# Patient Record
Sex: MALE | Race: ASIAN | NOT HISPANIC OR LATINO | URBAN - METROPOLITAN AREA
[De-identification: names, ages, dates, MRNs, and addresses within clinical notes are randomized per-mention and may not be internally consistent; named-entity substitution may affect disease eponyms.]

---

## 2017-05-23 ENCOUNTER — OUTPATIENT (OUTPATIENT)
Dept: OUTPATIENT SERVICES | Facility: HOSPITAL | Age: 82
LOS: 1 days | Discharge: HOME | End: 2017-05-23

## 2017-06-28 DIAGNOSIS — N39.0 URINARY TRACT INFECTION, SITE NOT SPECIFIED: ICD-10-CM

## 2017-06-28 DIAGNOSIS — N40.0 BENIGN PROSTATIC HYPERPLASIA WITHOUT LOWER URINARY TRACT SYMPTOMS: ICD-10-CM

## 2017-06-28 DIAGNOSIS — I10 ESSENTIAL (PRIMARY) HYPERTENSION: ICD-10-CM

## 2017-06-28 DIAGNOSIS — E03.8 OTHER SPECIFIED HYPOTHYROIDISM: ICD-10-CM

## 2017-06-28 DIAGNOSIS — M10.00 IDIOPATHIC GOUT, UNSPECIFIED SITE: ICD-10-CM

## 2017-06-28 DIAGNOSIS — D51.8 OTHER VITAMIN B12 DEFICIENCY ANEMIAS: ICD-10-CM

## 2017-06-28 DIAGNOSIS — E55.9 VITAMIN D DEFICIENCY, UNSPECIFIED: ICD-10-CM

## 2017-06-28 DIAGNOSIS — E78.00 PURE HYPERCHOLESTEROLEMIA, UNSPECIFIED: ICD-10-CM

## 2017-06-28 DIAGNOSIS — Z85.46 PERSONAL HISTORY OF MALIGNANT NEOPLASM OF PROSTATE: ICD-10-CM

## 2018-06-16 ENCOUNTER — EMERGENCY (EMERGENCY)
Facility: HOSPITAL | Age: 83
LOS: 0 days | Discharge: HOME | End: 2018-06-16
Attending: EMERGENCY MEDICINE | Admitting: EMERGENCY MEDICINE

## 2018-06-16 VITALS
SYSTOLIC BLOOD PRESSURE: 147 MMHG | RESPIRATION RATE: 18 BRPM | DIASTOLIC BLOOD PRESSURE: 78 MMHG | OXYGEN SATURATION: 97 % | TEMPERATURE: 96 F | HEART RATE: 79 BPM

## 2018-06-16 DIAGNOSIS — J45.909 UNSPECIFIED ASTHMA, UNCOMPLICATED: ICD-10-CM

## 2018-06-16 DIAGNOSIS — Z79.899 OTHER LONG TERM (CURRENT) DRUG THERAPY: ICD-10-CM

## 2018-06-16 DIAGNOSIS — J18.9 PNEUMONIA, UNSPECIFIED ORGANISM: ICD-10-CM

## 2018-06-16 DIAGNOSIS — R05 COUGH: ICD-10-CM

## 2018-06-16 DIAGNOSIS — Z79.51 LONG TERM (CURRENT) USE OF INHALED STEROIDS: ICD-10-CM

## 2018-06-16 RX ORDER — IPRATROPIUM/ALBUTEROL SULFATE 18-103MCG
3 AEROSOL WITH ADAPTER (GRAM) INHALATION ONCE
Qty: 0 | Refills: 0 | Status: COMPLETED | OUTPATIENT
Start: 2018-06-16 | End: 2018-06-16

## 2018-06-16 RX ORDER — DEXAMETHASONE 0.5 MG/5ML
10 ELIXIR ORAL ONCE
Qty: 0 | Refills: 0 | Status: COMPLETED | OUTPATIENT
Start: 2018-06-16 | End: 2018-06-16

## 2018-06-16 RX ORDER — GUAIFENESIN/DEXTROMETHORPHAN 600MG-30MG
1 TABLET, EXTENDED RELEASE 12 HR ORAL
Qty: 14 | Refills: 0
Start: 2018-06-16 | End: 2018-06-22

## 2018-06-16 RX ORDER — ALBUTEROL 90 UG/1
3 AEROSOL, METERED ORAL
Qty: 60 | Refills: 0
Start: 2018-06-16 | End: 2018-06-29

## 2018-06-16 RX ADMIN — Medication 10 MILLIGRAM(S): at 06:49

## 2018-06-16 RX ADMIN — Medication 3 MILLILITER(S): at 06:28

## 2018-06-16 RX ADMIN — Medication 3 MILLILITER(S): at 06:49

## 2018-06-16 NOTE — ED PROVIDER NOTE - ATTENDING CONTRIBUTION TO CARE
82 yo m with pmh of asthma, presents with c/o cough x 2 days.  saw dr. tena 2 days ago and was given rx for prednisone taper, and refills on his asthma meds.  pt admits does not have a nebulizer at home.  came this morning because was unable to sleep due to constant coughing last night.  no fever, no chills.  no abd pain, no cp, no leg swelling.  mild sob with wheezing.  exam: nad, ncat, perrl, eomi, mmm, rrr, scattered wheezing, left basilar rales, abd soft, nt,nd aox3, imp: pt with asthma, abnormal lung exam, r/o pna, cxr, nebs, decadron, abx.  paged dr. tena to discuss pt.  pt advised to return to ED asap if worsening sx or develops fever.  discussed that due to age, he is at risk of developing worsening pna and threshold for admission is low.  pt however would like to try oral meds at home first.  rx for nebulizer machine

## 2018-06-16 NOTE — ED PROVIDER NOTE - OBJECTIVE STATEMENT
82 y/o M with PMH of asthma presents with 2 days of cough. Denies any fevers, chills. No CP. No SOB. No nausea, vomiting. No abd pain. No leg swelling.

## 2018-06-16 NOTE — ED ADULT TRIAGE NOTE - AS O2 DELIVERY
----- Message from Osvaldo Burch MD sent at 1/17/2017  2:20 PM CST -----  Office f/u.  rec prednisone course. Last visit he was doing well?   ----- Message -----     From: Chepe Dolan MA     Sent: 1/17/2017  10:36 AM       To: Osvaldo Burch MD        ----- Message -----     From: Alyx Lee     Sent: 1/16/2017   2:26 PM       To: Marcin SAMUEL Staff    Patient is requesting a call back concerning his prescription he states he finished antiobotics but still does not feel well contact patient at [phone] or [phone]  Thank you     
Gave patient appointment for 1/19/2017 and advised the patient to start using his nebulizer. Patient already took 4 days of prednisone and still does not feel good.  
room air

## 2018-07-03 ENCOUNTER — OUTPATIENT (OUTPATIENT)
Dept: OUTPATIENT SERVICES | Facility: HOSPITAL | Age: 83
LOS: 1 days | Discharge: HOME | End: 2018-07-03

## 2018-07-03 DIAGNOSIS — R05 COUGH: ICD-10-CM

## 2018-07-03 DIAGNOSIS — I10 ESSENTIAL (PRIMARY) HYPERTENSION: ICD-10-CM

## 2018-07-03 DIAGNOSIS — N17.9 ACUTE KIDNEY FAILURE, UNSPECIFIED: ICD-10-CM

## 2018-07-05 DIAGNOSIS — N17.9 ACUTE KIDNEY FAILURE, UNSPECIFIED: ICD-10-CM

## 2018-07-05 DIAGNOSIS — I10 ESSENTIAL (PRIMARY) HYPERTENSION: ICD-10-CM

## 2018-07-05 DIAGNOSIS — Z02.9 ENCOUNTER FOR ADMINISTRATIVE EXAMINATIONS, UNSPECIFIED: ICD-10-CM

## 2018-07-07 ENCOUNTER — OUTPATIENT (OUTPATIENT)
Dept: OUTPATIENT SERVICES | Facility: HOSPITAL | Age: 83
LOS: 1 days | Discharge: HOME | End: 2018-07-07

## 2018-07-07 DIAGNOSIS — N18.3 CHRONIC KIDNEY DISEASE, STAGE 3 (MODERATE): ICD-10-CM

## 2018-07-14 ENCOUNTER — OUTPATIENT (OUTPATIENT)
Dept: OUTPATIENT SERVICES | Facility: HOSPITAL | Age: 83
LOS: 1 days | Discharge: HOME | End: 2018-07-14

## 2018-07-14 DIAGNOSIS — R07.9 CHEST PAIN, UNSPECIFIED: ICD-10-CM

## 2019-05-23 ENCOUNTER — OUTPATIENT (OUTPATIENT)
Dept: OUTPATIENT SERVICES | Facility: HOSPITAL | Age: 84
LOS: 1 days | Discharge: HOME | End: 2019-05-23

## 2019-05-23 DIAGNOSIS — N40.1 BENIGN PROSTATIC HYPERPLASIA WITH LOWER URINARY TRACT SYMPTOMS: ICD-10-CM

## 2019-05-23 DIAGNOSIS — E03.9 HYPOTHYROIDISM, UNSPECIFIED: ICD-10-CM

## 2019-05-23 DIAGNOSIS — E78.00 PURE HYPERCHOLESTEROLEMIA, UNSPECIFIED: ICD-10-CM

## 2019-05-23 DIAGNOSIS — I10 ESSENTIAL (PRIMARY) HYPERTENSION: ICD-10-CM

## 2019-05-23 PROBLEM — J45.909 UNSPECIFIED ASTHMA, UNCOMPLICATED: Chronic | Status: ACTIVE | Noted: 2018-06-16

## 2019-08-23 ENCOUNTER — OUTPATIENT (OUTPATIENT)
Dept: OUTPATIENT SERVICES | Facility: HOSPITAL | Age: 84
LOS: 1 days | Discharge: HOME | End: 2019-08-23

## 2019-08-23 DIAGNOSIS — E78.00 PURE HYPERCHOLESTEROLEMIA, UNSPECIFIED: ICD-10-CM

## 2022-08-31 ENCOUNTER — EMERGENCY (EMERGENCY)
Facility: HOSPITAL | Age: 87
LOS: 0 days | Discharge: NURSING, REHAB OR LONG-TERM CARE | End: 2022-08-31
Admitting: EMERGENCY MEDICINE

## 2022-08-31 ENCOUNTER — INPATIENT (INPATIENT)
Facility: HOSPITAL | Age: 87
LOS: 0 days | Discharge: HOME | End: 2022-09-01
Attending: INTERNAL MEDICINE | Admitting: INTERNAL MEDICINE

## 2022-08-31 VITALS
OXYGEN SATURATION: 95 % | SYSTOLIC BLOOD PRESSURE: 140 MMHG | RESPIRATION RATE: 17 BRPM | HEART RATE: 95 BPM | TEMPERATURE: 97 F | DIASTOLIC BLOOD PRESSURE: 66 MMHG

## 2022-08-31 DIAGNOSIS — I12.9 HYPERTENSIVE CHRONIC KIDNEY DISEASE WITH STAGE 1 THROUGH STAGE 4 CHRONIC KIDNEY DISEASE, OR UNSPECIFIED CHRONIC KIDNEY DISEASE: ICD-10-CM

## 2022-08-31 DIAGNOSIS — R53.83 OTHER FATIGUE: ICD-10-CM

## 2022-08-31 DIAGNOSIS — J45.909 UNSPECIFIED ASTHMA, UNCOMPLICATED: ICD-10-CM

## 2022-08-31 DIAGNOSIS — J12.82 PNEUMONIA DUE TO CORONAVIRUS DISEASE 2019: ICD-10-CM

## 2022-08-31 DIAGNOSIS — N18.9 CHRONIC KIDNEY DISEASE, UNSPECIFIED: ICD-10-CM

## 2022-08-31 DIAGNOSIS — U07.1 COVID-19: ICD-10-CM

## 2022-08-31 DIAGNOSIS — R06.02 SHORTNESS OF BREATH: ICD-10-CM

## 2022-08-31 DIAGNOSIS — M79.89 OTHER SPECIFIED SOFT TISSUE DISORDERS: ICD-10-CM

## 2022-08-31 LAB
ALBUMIN SERPL ELPH-MCNC: 3.8 G/DL — SIGNIFICANT CHANGE UP (ref 3.5–5.2)
ALP SERPL-CCNC: 72 U/L — SIGNIFICANT CHANGE UP (ref 30–115)
ALT FLD-CCNC: 13 U/L — SIGNIFICANT CHANGE UP (ref 0–41)
ANION GAP SERPL CALC-SCNC: 15 MMOL/L — HIGH (ref 7–14)
AST SERPL-CCNC: 30 U/L — SIGNIFICANT CHANGE UP (ref 0–41)
BASOPHILS # BLD AUTO: 0.03 K/UL — SIGNIFICANT CHANGE UP (ref 0–0.2)
BASOPHILS NFR BLD AUTO: 0.5 % — SIGNIFICANT CHANGE UP (ref 0–1)
BILIRUB SERPL-MCNC: 0.5 MG/DL — SIGNIFICANT CHANGE UP (ref 0.2–1.2)
BUN SERPL-MCNC: 25 MG/DL — HIGH (ref 10–20)
CALCIUM SERPL-MCNC: 9.1 MG/DL — SIGNIFICANT CHANGE UP (ref 8.5–10.1)
CHLORIDE SERPL-SCNC: 110 MMOL/L — SIGNIFICANT CHANGE UP (ref 98–110)
CO2 SERPL-SCNC: 22 MMOL/L — SIGNIFICANT CHANGE UP (ref 17–32)
CREAT SERPL-MCNC: 1 MG/DL — SIGNIFICANT CHANGE UP (ref 0.7–1.5)
CRP SERPL-MCNC: 30.5 MG/L — HIGH
D DIMER BLD IA.RAPID-MCNC: 269 NG/ML DDU — HIGH (ref 0–230)
EGFR: 73 ML/MIN/1.73M2 — SIGNIFICANT CHANGE UP
EOSINOPHIL # BLD AUTO: 0.16 K/UL — SIGNIFICANT CHANGE UP (ref 0–0.7)
EOSINOPHIL NFR BLD AUTO: 2.7 % — SIGNIFICANT CHANGE UP (ref 0–8)
GLUCOSE SERPL-MCNC: 95 MG/DL — SIGNIFICANT CHANGE UP (ref 70–99)
HCT VFR BLD CALC: 38.5 % — LOW (ref 42–52)
HGB BLD-MCNC: 13.6 G/DL — LOW (ref 14–18)
IMM GRANULOCYTES NFR BLD AUTO: 1.2 % — HIGH (ref 0.1–0.3)
LYMPHOCYTES # BLD AUTO: 1.35 K/UL — SIGNIFICANT CHANGE UP (ref 1.2–3.4)
LYMPHOCYTES # BLD AUTO: 22.4 % — SIGNIFICANT CHANGE UP (ref 20.5–51.1)
MCHC RBC-ENTMCNC: 31.5 PG — HIGH (ref 27–31)
MCHC RBC-ENTMCNC: 35.3 G/DL — SIGNIFICANT CHANGE UP (ref 32–37)
MCV RBC AUTO: 89.1 FL — SIGNIFICANT CHANGE UP (ref 80–94)
MONOCYTES # BLD AUTO: 0.51 K/UL — SIGNIFICANT CHANGE UP (ref 0.1–0.6)
MONOCYTES NFR BLD AUTO: 8.5 % — SIGNIFICANT CHANGE UP (ref 1.7–9.3)
NEUTROPHILS # BLD AUTO: 3.9 K/UL — SIGNIFICANT CHANGE UP (ref 1.4–6.5)
NEUTROPHILS NFR BLD AUTO: 64.7 % — SIGNIFICANT CHANGE UP (ref 42.2–75.2)
NRBC # BLD: 0 /100 WBCS — SIGNIFICANT CHANGE UP (ref 0–0)
NT-PROBNP SERPL-SCNC: 1036 PG/ML — HIGH (ref 0–300)
PLATELET # BLD AUTO: 190 K/UL — SIGNIFICANT CHANGE UP (ref 130–400)
POTASSIUM SERPL-MCNC: 4.2 MMOL/L — SIGNIFICANT CHANGE UP (ref 3.5–5)
POTASSIUM SERPL-SCNC: 4.2 MMOL/L — SIGNIFICANT CHANGE UP (ref 3.5–5)
PROT SERPL-MCNC: 6.5 G/DL — SIGNIFICANT CHANGE UP (ref 6–8)
RBC # BLD: 4.32 M/UL — LOW (ref 4.7–6.1)
RBC # FLD: 13.2 % — SIGNIFICANT CHANGE UP (ref 11.5–14.5)
SARS-COV-2 RNA SPEC QL NAA+PROBE: DETECTED
SODIUM SERPL-SCNC: 147 MMOL/L — HIGH (ref 135–146)
TROPONIN T SERPL-MCNC: <0.01 NG/ML — SIGNIFICANT CHANGE UP
WBC # BLD: 6.02 K/UL — SIGNIFICANT CHANGE UP (ref 4.8–10.8)
WBC # FLD AUTO: 6.02 K/UL — SIGNIFICANT CHANGE UP (ref 4.8–10.8)

## 2022-08-31 PROCEDURE — 93010 ELECTROCARDIOGRAM REPORT: CPT

## 2022-08-31 PROCEDURE — 99285 EMERGENCY DEPT VISIT HI MDM: CPT | Mod: CS

## 2022-08-31 PROCEDURE — 71045 X-RAY EXAM CHEST 1 VIEW: CPT | Mod: 26

## 2022-08-31 PROCEDURE — 93970 EXTREMITY STUDY: CPT | Mod: 26

## 2022-08-31 RX ORDER — AMLODIPINE BESYLATE 2.5 MG/1
1 TABLET ORAL
Qty: 0 | Refills: 0 | DISCHARGE

## 2022-08-31 RX ORDER — METOPROLOL TARTRATE 50 MG
100 TABLET ORAL DAILY
Refills: 0 | Status: DISCONTINUED | OUTPATIENT
Start: 2022-08-31 | End: 2022-09-01

## 2022-08-31 RX ORDER — ACETAMINOPHEN 500 MG
650 TABLET ORAL EVERY 6 HOURS
Refills: 0 | Status: DISCONTINUED | OUTPATIENT
Start: 2022-08-31 | End: 2022-09-01

## 2022-08-31 RX ORDER — FLUTICASONE FUROATE AND VILANTEROL TRIFENATATE 100; 25 UG/1; UG/1
0 POWDER RESPIRATORY (INHALATION)
Qty: 0 | Refills: 0 | DISCHARGE

## 2022-08-31 RX ORDER — MONTELUKAST 4 MG/1
0 TABLET, CHEWABLE ORAL
Qty: 0 | Refills: 0 | DISCHARGE

## 2022-08-31 RX ORDER — LANOLIN ALCOHOL/MO/W.PET/CERES
3 CREAM (GRAM) TOPICAL AT BEDTIME
Refills: 0 | Status: DISCONTINUED | OUTPATIENT
Start: 2022-08-31 | End: 2022-09-01

## 2022-08-31 RX ORDER — VALSARTAN 80 MG/1
320 TABLET ORAL DAILY
Refills: 0 | Status: DISCONTINUED | OUTPATIENT
Start: 2022-08-31 | End: 2022-09-01

## 2022-08-31 RX ORDER — FUROSEMIDE 40 MG
20 TABLET ORAL ONCE
Refills: 0 | Status: COMPLETED | OUTPATIENT
Start: 2022-08-31 | End: 2022-08-31

## 2022-08-31 RX ORDER — LEVOTHYROXINE SODIUM 125 MCG
1 TABLET ORAL
Qty: 0 | Refills: 0 | DISCHARGE

## 2022-08-31 RX ORDER — HYDROCHLOROTHIAZIDE 25 MG
25 TABLET ORAL DAILY
Refills: 0 | Status: DISCONTINUED | OUTPATIENT
Start: 2022-08-31 | End: 2022-09-01

## 2022-08-31 RX ORDER — CEFTRIAXONE 500 MG/1
1000 INJECTION, POWDER, FOR SOLUTION INTRAMUSCULAR; INTRAVENOUS ONCE
Refills: 0 | Status: COMPLETED | OUTPATIENT
Start: 2022-08-31 | End: 2022-08-31

## 2022-08-31 RX ORDER — ENOXAPARIN SODIUM 100 MG/ML
40 INJECTION SUBCUTANEOUS EVERY 24 HOURS
Refills: 0 | Status: DISCONTINUED | OUTPATIENT
Start: 2022-08-31 | End: 2022-09-01

## 2022-08-31 RX ORDER — AZITHROMYCIN 500 MG/1
500 TABLET, FILM COATED ORAL ONCE
Refills: 0 | Status: COMPLETED | OUTPATIENT
Start: 2022-08-31 | End: 2022-08-31

## 2022-08-31 RX ORDER — AMLODIPINE BESYLATE 2.5 MG/1
5 TABLET ORAL DAILY
Refills: 0 | Status: DISCONTINUED | OUTPATIENT
Start: 2022-08-31 | End: 2022-09-01

## 2022-08-31 RX ORDER — NEBIVOLOL HYDROCHLORIDE 5 MG/1
1 TABLET ORAL
Qty: 0 | Refills: 0 | DISCHARGE

## 2022-08-31 RX ORDER — ALBUTEROL 90 UG/1
2 AEROSOL, METERED ORAL EVERY 4 HOURS
Refills: 0 | Status: DISCONTINUED | OUTPATIENT
Start: 2022-08-31 | End: 2022-09-01

## 2022-08-31 RX ORDER — ONDANSETRON 8 MG/1
4 TABLET, FILM COATED ORAL EVERY 8 HOURS
Refills: 0 | Status: DISCONTINUED | OUTPATIENT
Start: 2022-08-31 | End: 2022-09-01

## 2022-08-31 RX ORDER — LEVOTHYROXINE SODIUM 125 MCG
50 TABLET ORAL DAILY
Refills: 0 | Status: DISCONTINUED | OUTPATIENT
Start: 2022-08-31 | End: 2022-09-01

## 2022-08-31 RX ORDER — GUAIFENESIN/DEXTROMETHORPHAN 600MG-30MG
10 TABLET, EXTENDED RELEASE 12 HR ORAL EVERY 4 HOURS
Refills: 0 | Status: DISCONTINUED | OUTPATIENT
Start: 2022-08-31 | End: 2022-09-01

## 2022-08-31 RX ADMIN — CEFTRIAXONE 100 MILLIGRAM(S): 500 INJECTION, POWDER, FOR SOLUTION INTRAMUSCULAR; INTRAVENOUS at 14:33

## 2022-08-31 RX ADMIN — Medication 10 MILLILITER(S): at 21:45

## 2022-08-31 RX ADMIN — AZITHROMYCIN 255 MILLIGRAM(S): 500 TABLET, FILM COATED ORAL at 15:28

## 2022-08-31 RX ADMIN — Medication 20 MILLIGRAM(S): at 14:33

## 2022-08-31 NOTE — ED PROVIDER NOTE - CLINICAL SUMMARY MEDICAL DECISION MAKING FREE TEXT BOX
Pt with persisting sob since covid diagnosis, found to have L basilar opacity/effusion, started on abx, will admit due to high risk of decompensation.

## 2022-08-31 NOTE — PATIENT PROFILE ADULT - FALL HARM RISK - HARM RISK INTERVENTIONS

## 2022-08-31 NOTE — H&P ADULT - NSHPREVIEWOFSYSTEMS_GEN_ALL_CORE
REVIEW OF SYSTEMS:    CONSTITUTIONAL:  No weakness, fevers or chills  EYES/ENT:  No visual changes;  No vertigo or throat pain   NECK:  No pain or stiffness  RESPIRATORY:  No cough, wheezing, hemoptysis; No shortness of breath  CARDIOVASCULAR:  No chest pain or palpitations  GASTROINTESTINAL:  No abdominal or epigastric pain. No nausea, vomiting, or hematemesis; No diarrhea or constipation. No melena or hematochezia.  GENITOURINARY:  No dysuria, frequency or hematuria  NEUROLOGICAL:  No numbness or weakness  SKIN:  No itching, rashes REVIEW OF SYSTEMS:  CONSTITUTIONAL:  no fevers or chills  EYES/ENT:  No visual changes;  No vertigo or throat pain   NECK:  No pain or stiffness  RESPIRATORY:  + cough with yellow sputum , + hemoptysis x1 - resolved  CARDIOVASCULAR:  No chest pain or palpitations  GASTROINTESTINAL:  No abdominal or epigastric pain. No nausea, vomiting, or hematemesis; No diarrhea or constipation. No melena or hematochezia.  GENITOURINARY:  No dysuria, frequency or hematuria  NEUROLOGICAL:  No numbness or weakness  SKIN:  No itching, rashes

## 2022-08-31 NOTE — H&P ADULT - HISTORY OF PRESENT ILLNESS
86 yo m  PMH of htn, asthma, ckd  Presents with c/o persisting sob.    Pt was recently dx with covid in [place] ()  Symptoms started 5 days ago whiel on a cruise. Airlifted to hospital in [place].  Treated for asthma exacerbation, given steroids x 1 and furosemide  DCed on  paxlovid but did not take because was concerned for his kidneys.     Pt felt sob again the next day after dicharge and went back to the ED. Per family his pO2 this morning on pulse ox was 91% and had POWERS + leg swelling.  Pt denies cp.  denies fever or chills.  mild fatigue.  no n/v/d.    Wife also tested positive for COVID and is currently admitted    In the ED :  WBG : 6  DD: 269  Na : 137  CRP : 30   BNP: 1k  trop <0.01 x1   CXR : Left basilar opacity/effusion  s/p ceftriaxone , azithro x1 , lasiz 20 Iv x1 86 yo m  PMH of htn, asthma, hypothryoid   Presents with c/o persisting sob.    Pt was recently dx with covid in [place] 8/27, symptoms started 8/26  Symptoms started while on a cruise with other sick passengers. Wife was also sick Airlifted to hospital in [place].  Treated for asthma exacerbation, given steroids x 1 and furosemide  DCed on  paxlovid but did not take because was concerned for his kidneys.     Pt felt sob again the next day after discharge and went back to the ED. Per family his pO2 this morning on pulse ox was 91% and had POWERS + leg swelling.  Pt denies cp.  Denies fever or chills.  mild fatigue.  no n/v/d.    Wife also tested positive for COVID and is currently admitted    In the ED :  BP : 140/66  HR : 95  O2 : 95 on RA   T : 96.8    WBC : 6  DD: 269  Na : 137  CRP : 30   BNP: 1k  trop <0.01 x1   CXR : Left basilar opacity/effusion  s/p ceftriaxone x1 , azithro x1 , lasix 20 Iv x1

## 2022-08-31 NOTE — H&P ADULT - ASSESSMENT
88 yo m with PMH of htn, asthma, hypothryoid presents with c/o persisting sob with + COVID.      # + COVID   - not requiring O2   - Dx with covid in [place] 8/27, symptoms started 8/26  - Vaccinated x3 Moderna , Last dose jan  - in [place] at hospital got steroids x 1 and furosemide  - s/p zpac , pred PO at home   - CXR : Left basilar opacity/effusion  - in ED s/p ceftriaxone x1 , azithro x1 , lasix 20 Iv x1  - consulted ID for remdesivir if needed.    # HTN  - c/w home amlodipine 5 qd, hctz 25 qd, va;lsartan 320 qd  - home nebivolol -> metoprolol while inpatient     # Asthma   - Albuterol PRN q4     # Hypothyroid   - levothyroxine 50 qd     # DVT : Lovenox   # Diet : regular   # Dispo : from home   # Activity : as tolerated , Pt consulted

## 2022-08-31 NOTE — ED PROVIDER NOTE - PHYSICAL EXAMINATION
VITAL SIGNS: I have reviewed nursing notes and confirm.  CONSTITUTIONAL: Well-developed; well-nourished; in no acute distress.  SKIN: Skin exam is warm and dry, no acute rash.  HEAD: Normocephalic; atraumatic.  EYES: PERRL, EOM intact; conjunctiva and sclera clear.  ENT: No nasal discharge; airway clear. TMs clear.  NECK: Supple; non tender.  CARD: S1, S2 normal; no murmurs, gallops, or rubs. Regular rate and rhythm.  RESP: No wheezes, rales, bibasilar ronchi mild, R>L  ABD: Normal bowel sounds; soft; non-distended; non-tender;  EXT: Normal ROM. No clubbing, cyanosis or mild pitting edema.  PSYCH: Cooperative, appropriate.

## 2022-08-31 NOTE — ED PROVIDER NOTE - NS ED ROS FT
Review of Systems:  	•	CONSTITUTIONAL - no fever, no diaphoresis, no weight change  	•	SKIN - no rash  	•	HEMATOLOGIC - no bleeding, no bruising  	•	EYES - no eye pain, no blurred vision  	•	ENT - no change in hearing, no pain  	•	RESPIRATORY - + shortness of breath, no cough  	•	CARDIAC - no chest pain, no palpitations  	•	GI - no abd pain, no nausea, no vomiting, no diarrhea, no constipation, no bleeding  	•	 - no dysuria, no hematuria, no flank pain, no urinary retention  	•	MUSCULOSKELETAL - no joint paint, no swelling, no redness  	•	NEUROLOGIC - no focal weakness or numbness, no headache, no paresthesias, no dizziness, no facial droop, no slurred speech, no vision changes  All other systems negative, unless specified in HPI

## 2022-08-31 NOTE — H&P ADULT - ATTENDING COMMENTS
***My note supersedes any discrepancies that may be above in the resident's note***    88 yo m with PMH of htn, asthma, hypothryoid presents with c/o persisting dyspnea in the setting of positive COVID- pcr.    # Dsypnea  # + COVID   # Hx of asthma  - currently afebrile, HDS, and satting well on RA  - on exam, no audible course breath sounds or wheezing  - Dx with covid in [place] 8/27, symptoms started 8/26  - Vaccinated x3 Moderna , Last dose jan  - in [place] at hospital got steroids x 1 and furosemide  - s/p zpac , pred PO at home   - CXR : Left basilar opacity/effusion  - no leukocytosis, procal not significantly elevated at 0.2; low concern for bacterial PNA  - in ED s/p ceftriaxone x1 , azithro x1 , lasix 20 Iv x1  - ID consulted:  start on RDV (200 mg on day 1, 100 mg on day 2 or 3) x 3 days     # HTN  - c/w home amlodipine 5 qd, hctz 25 qd, valsartan 320 qd  - home nebivolol -> metoprolol while inpatient     # Asthma   - Albuterol PRN q4     # Hypothyroid   - levothyroxine 50 qd     # DVT : Lovenox   # Diet : regular   # Dispo : from home   # Activity : as tolerated , Pt consulted

## 2022-08-31 NOTE — ED PROVIDER NOTE - OBJECTIVE STATEMENT
86 yo m with pmh of htn, asthma, ckd, presents with c/o persisting sob.  pt was recently dx with covid.  symptoms started 5 days ago.  was airlifted to [place] from cruise ship and evaluated in ED.  pt was treated for asthma exacerbation, given steroids x 1 and furosemide, and given rx for paxlovid.  pt did not take paxlovid because was concerned for his kidneys.  pt felt sob again the next day and went back to the ED.  as per family, his pO2 this morning on pulse ox was 91% and had POWERS.  pt denies cp.  +admits b/l leg swelling  denies fever or chills.  mild fatigue.  no n/v/d.  wife also tested positive and is currently admitted

## 2022-08-31 NOTE — H&P ADULT - NSHPLABSRESULTS_GEN_ALL_CORE
(08-31 @ 12:13)                      13.6  6.02 )-----------( 190                 38.5    Neutrophils = 3.90 (64.7%)  Lymphocytes = 1.35 (22.4%)  Eosinophils = 0.16 (2.7%)  Basophils = 0.03 (0.5%)  Monocytes = 0.51 (8.5%)  Bands = --%    08-31    147<H>  |  110  |  25<H>  ----------------------------<  95  4.2   |  22  |  1.0    Ca    9.1      31 Aug 2022 12:13    TPro  6.5  /  Alb  3.8  /  TBili  0.5  /  DBili  x   /  AST  30  /  ALT  13  /  AlkPhos  72  08-31      CARDIAC MARKERS ( 31 Aug 2022 12:13 )  Trop <0.01 ng/mL / CK x     / CKMB x

## 2022-08-31 NOTE — ED ADULT NURSE NOTE - NSIMPLEMENTINTERV_GEN_ALL_ED
Implemented All Fall with Harm Risk Interventions:  [place] to call system. Call bell, personal items and telephone within reach. Instruct patient to call for assistance. Room bathroom lighting operational. Non-slip footwear when patient is off stretcher. Physically safe environment: no spills, clutter or unnecessary equipment. Stretcher in lowest position, wheels locked, appropriate side rails in place. Provide visual cue, wrist band, yellow gown, etc. Monitor gait and stability. Monitor for mental status changes and reorient to person, place, and time. Review medications for side effects contributing to fall risk. Reinforce activity limits and safety measures with patient and family. Provide visual clues: red socks.

## 2022-08-31 NOTE — PATIENT PROFILE ADULT - HOW PATIENT ADDRESSED, PROFILE
Formerly named [facility] Physician Discharge Summary       Constantine Menendez MD  [address] [facility] [address]  [phone]    In 2 weeks  follow up    Magali Gage MD  [place] [phone]    In 1 week  follow up    Unique Aranda MD  [address] [facility] [address] [id number]  [phone]      As needed      Activity level:as tolerated    Diet: ADULT DIET; Regular; Low Fat/Low Chol/High Fiber/LUZ; Low Sodium (2 gm); 2000 ml      Labs: CBC, BMP Monday  Condition at discharge:  stable  Dispo: home      Patient ID:  Konrad Kim  [id number]  80 y.o.  1937    Admit date: 3/1/2022    Discharge date and time:  3/4/2022  11:00 AM    Admission Diagnoses: Principal Problem:    Acute on chronic combined systolic and diastolic CHF (congestive heart failure) (HCC)  Active Problems:    Chronic atrial fibrillation    Ischemic heart disease    Stage 3b chronic kidney disease ([address]    Coronary artery disease involving coronary bypass graft    S/P TAVR (transcatheter aortic valve replacement)    Essential hypertension    Hyperlipidemia LDL goal <100    ICD (implantable cardioverter-defibrillator), dual, in situ    Gastroesophageal reflux disease without esophagitis  Resolved Problems:    * No resolved hospital problems. *      Discharge Diagnoses: Principal Problem:    Acute on chronic combined systolic and diastolic CHF (congestive heart failure) (HCC)  Active Problems:    Chronic atrial fibrillation    Ischemic heart disease    Stage 3b chronic kidney disease (HCC)    Coronary artery disease involving coronary bypass graft    S/P TAVR (transcatheter aortic valve replacement)    Essential hypertension    Hyperlipidemia LDL goal <100    ICD (implantable cardioverter-defibrillator), dual, in situ    Gastroesophageal reflux disease without esophagitis  Resolved Problems:    * No resolved hospital problems.  *      Consults:  IP CONSULT TO HEART FAILURE NURSE/COORDINATOR  IP CONSULT TO DIETITIAN  IP CONSULT TO CARDIOLOGY    Procedures:  none    Hospital Course:     80 y.o. male with a past medical  history of CHF, atrial fibrillation, nonsustained ventricular tachycardia, ischemic heart disease,  hyperlipidemia, hypertension, history of MI, insomnia, remote history of gastrointestinal hemorrhage with melena, ICD placement, vitamin D deficiency, and macular degeneration who was admitted for acute on chronic CHF after presenting to the ER for worsening shortness of breath. Consult was made to [facility] cardiology and updated echocardiogram obtained 03/02 showing EF 73% stage II diastolic dysfunction. He was started on IV lasix and eventually transitioned to oral. Dose was reduced to 40 mg. Home dose was 60 mg daily. Lipid panel showed elevated triglycerides at 450. Lipid medications were not changed due to history or prior intolerance to other agents. Patient was noted to be anemic with hgb 8. 6. Down from 13.0 in October 2021. No overt signs of bleeding. Patient does have a remote history of GIB. We will have repeat labs ordered for Monday and he will need to follow up with his PCP. Normal iron studies, patient was asymptomatic. His shortness of breath resolved and he is stable for discharge to home with follow up as above.        Discharge Exam:  Vitals:    03/04/22 0211 03/04/22 0330 03/04/22 0715 03/04/22 0745   BP:  115/72 (!) 104/48 136/63   Pulse:  90 73 79   Resp:  18 16 18   Temp:  98.3 °F (36.8 °C) 98 °F (36.7 °C) 97.7 °F (36.5 °C)   TempSrc:  Oral Oral Oral   SpO2:  93% 93% 96%   Weight: 167 lb 8 oz (76 kg)      Height:           General Appearance: alert and oriented to person, place and time and in no acute distress  Skin: warm and dry  Head: normocephalic and atraumatic  Eyes: pupils equal, round, and reactive to light, extraocular eye movements intact, conjunctivae normal  Neck: neck supple and non tender without mass   Pulmonary/Chest: fine crackles subhash bases. no wheezes, rales or rhonchi, normal air movement, no respiratory distress  Cardiovascular: normal rate, irregular rythm normal S1 and S2 and no carotid bruits  Abdomen: soft, non-tender, non-distended, normal bowel sounds, no masses or organomegaly  Extremities: no cyanosis, no clubbing and no edema  Neurologic: no cranial nerve deficit and speech normal  I/O last 3 completed shifts: In: 200 [P.O.:660; I.V.:5]  Out: 2925 [Urine:2925]  No intake/output data recorded. LABS:  Recent Labs     03/02/22  1010 03/03/22  0310 03/04/22  0340    141 138   K 4.0 4.2 3.9    102 100   CO2 28 28 28   BUN 38* 41* 39*   CREATININE 1.3* 1.1 1.1   GLUCOSE 136* 120* 123*   CALCIUM 9.0 8.7 8.6       Recent Labs     03/02/22  1010 03/03/22  0310 03/04/22  0340   WBC 8.2 8.9 8.6   RBC 2.98* 2.75* 2.78*   HGB 9.2* 8.6* 8.6*   HCT 28.2* 26.5* 26.8*   MCV 94.6 96.4 96.4   MCH 30.9 31.3 30.9   MCHC 32.6 32.5 32.1   RDW 15.2* 15.4* 15.6*    189 192   MPV 10.7 11.0 10.3       No results for input(s): POCGLU in the last 72 hours. Imaging:  Echo Complete    Result Date: 3/2/2022  Transthoracic Echocardiography Report (TTE)  Demographics   Patient Name        Ramona Milligan Gender                Male   Medical Record      [id number]        Room Number           0533  Number   Account #           [de-identified]       Procedure Date        03/02/2022   Corporate ID                        Ordering Physician   Accession Number    [id number]      Referring Physician   Marlena Russ   Date of Birth       1937      Sonographer           Mabel Abdul [facility]   Age                 80 year(s)      Interpreting          Dearl Severino PETERSON                                      Physician                                       Any Other  Procedure Type of Study   TTE procedure:Echo Complete W/Doppler & Color Flow.   Procedure Date Date: 03/02/2022 Start: 07:31 AM Study Location: Echo Lab Technical Quality: Limited visualization due to poor acoustical window. Indications:Congestive heart failure. Patient Status: Routine Height: 68 inches Weight: 170 pounds BSA: 1.91 m^2 BMI: 25.85 kg/m^2 HR: 78 bpm BP: 113/58 mmHg  Findings   Left Ventricle  Left ventricle size is normal.  Concentric left ventricular hypertrophy. Normal left ventricular systolic function. Ejection fraction is visually estimated at 60%. There is doppler evidence of stage II diastolic dysfunction. Right Ventricle  Mildly dilated right ventricle with normal right ventricular function. Pacer/ICD wire visualized in the right ventricle. Left Atrium  Moderately dilated left atrium by volume index. Right Atrium  Dilated right atrium. Mitral Valve  Mild mitral regurgitation. MV mean gradient 4 mmHg. Tricuspid Valve  Mild tricuspid regurgitation. PASP is estimated at 44 mmHg. Aortic Valve  History of TAVR (ZANE 3) with 26 mm bioprosthetic valve. No significant paravalvular aortic regurgitation. AV peak velocity 2.1 m/s. AV mean gradient 9 mmHg. AV area 1.5 cm2. Pulmonic Valve  Mild pulmonic regurgitation. Pericardial Effusion  No evidence of a hemodynamically significant pericardial effusion. Aorta  Aortic root dimension within normal limits. Conclusions   Summary  Normal left ventricular systolic function. Ejection fraction is visually estimated at 60%. Mildly dilated right ventricle with normal right ventricular function. There is doppler evidence of stage II diastolic dysfunction. Moderately dilated left atrium by volume index. Mild mitral regurgitation. History of TAVR (ZANE 3) with 26 mm bioprosthetic valve. No significant paravalvular aortic regurgitation. AV peak velocity 2.1 m/s. AV mean gradient 9 mmHg. AV area 1.5 cm2. Mild tricuspid regurgitation. PASP is estimated at 44 mmHg.    Signature   ----------------------------------------------------------------  Electronically signed by Cristi Mello MD(Interpreting physician) on 03/02/2022 12:04 PM  ----------------------------------------------------------------  M-Mode/2D Measurements & Calculations   LV Diastolic    LV Systolic Dimension: 3.3   AV Cusp Separation: 1.4 cmLA  Dimension: 4.7  cm                           Dimension: 4.3 cmAO Root  cm              LV Volume Diastolic: 505 ml  Dimension: 1.9 cm  LV FS:29.8 %    LV Volume Systolic: 39.3 ml  LV PW           LV EDV/LV EDV Index: 290  Diastolic: 1.6  MY/30 DD/S^1HQ ESV/LV ESV  cm              Index: 43.8 ml/23ml/ m^2     RV Diastolic Dimension: 3.7  LV PW Systolic: EF Calculated: 17.0 %        cm  1.7 cm          LV Mass Index: 195 l/min*m^2  Septum                                       LA/Aorta: 0.59  Diastolic: 1.9                               Ascending Aorta: 2.2 cm  cm              LVOT: 2 cm                   LA volume/Index: 87 ml  Septum                                       /16BE/U^5  Systolic: 2 cm                               RA Area: 20.2 cm^2  CO: 4.75 l/min  CI: 2.49                                     IVC Expiration: 1.8 cm  l/m*m^2  LV Mass: 372.96  g  Doppler Measurements & Calculations   MV Peak E-Wave: 1.42 AV Peak Velocity: 2.12 LVOT Peak Velocity: 0.96 m/s  m/s                  m/s                    LVOT Mean Velocity: 0.64 m/s  MV Peak A-Wave: 0.96 AV Peak Gradient:      LVOT Peak Gradient: 3.7  m/s                  17.89 mmHg             mmHgLVOT Mean Gradient: 1.9  MV E/A Ratio: 1.48   AV Mean Velocity: 1.47 mmHg  MV Peak Gradient:    m/s                    Estimated RVSP: 44.4 mmHg  10.9 mmHg            AV Mean Gradient: 9.3  Estimated RAP:3 mmHg  MV Mean Gradient:    mmHg  4.3 mmHg             AV VTI: 41.4 cm  MV Mean Velocity:    AV Area                TR Velocity:3.22 m/s  0.95 m/s             (Continuity):1.47 cm^2 TR Gradient:41.34 mmHg  MV Deceleration      AV Acceleration Time:  PV Peak Velocity: 1.12 m/s  Time: 184.9 msec     69.2 msec              PV Peak Gradient: 4.98 mmHg  MV P1/2t: 67.6 msec  LVOT VTI: 19.4 cm      PV Mean Velocity: 0.7 m/s  MVA by PHT:3.25 cm^2                        PV Mean Gradient: 2.3 mmHg  MV Area              Estimated PASP: 44.34  (continuity): 1.5    mmHg  cm^2  MV E' Septal  Velocity: 0.04 m/s  MV E' Lateral  Velocity: 7 m/s  [email or web address] [id number]    XR CHEST PORTABLE    Result Date: 3/1/2022  EXAMINATION: ONE XRAY VIEW OF THE CHEST 3/1/2022 2:03 pm COMPARISON: None. HISTORY: ORDERING SYSTEM PROVIDED HISTORY: A-fib TECHNOLOGIST PROVIDED HISTORY: Reason for exam:->A-fib FINDINGS: Cardiac silhouette is enlarged. Postop sternotomy changes are noted. There is a dual lead cardiac pacer on the left The lungs are clear. No findings of CHF or pneumonia. 1. Cardiomegaly. There are no findings of CHF or pneumonia. Patient Instructions:      Medication List      CHANGE how you take these medications    furosemide 40 MG tablet  Commonly known as: LASIX  Take 1 tablet by mouth daily New lower dose  What changed:   · how much to take  · how to take this  · when to take this  · additional instructions     rivaroxaban 15 MG Tabs tablet  Commonly known as: XARELTO  Take 1 tablet by mouth daily New lower dose  What changed:   · medication strength  · See the new instructions. CONTINUE taking these medications    amLODIPine 2.5 MG tablet  Commonly known as: NORVASC  Take 1 tablet by mouth daily     dofetilide 250 MCG capsule  Commonly known as: TIKOSYN  Take 1 capsule by mouth 2 times daily     ferrous sulfate 325 (65 Fe) MG tablet  Commonly known as: IRON 325  Take 1 tablet by mouth daily (with breakfast)     Handicap Placard Misc  by Does not apply route Disp #: 1  Duration: 5 years.      MAGnesium-Oxide 400 (241.3 Mg) MG Tabs tablet  Generic drug: magnesium oxide     metoprolol tartrate 50 MG tablet  Commonly known as: LOPRESSOR  TAKE 1 AND 1/2 TABLETS BY  MOUTH IN THE MORNING AND 1  TABLET BY MOUTH AT BEDTIME     pantoprazole 40 MG tablet  Commonly known as: PROTONIX  TAKE 1 TABLET BY MOUTH  DAILY     potassium chloride 10 MEQ extended release tablet  Commonly known as: KLOR-CON M  TAKE 1 TABLET BY MOUTH  DAILY     pravastatin 40 MG tablet  Commonly known as: PRAVACHOL  TAKE 1 TABLET BY MOUTH  DAILY     PRESERVISION AREDS 2 PO     vitamin D 50 MCG (2000 UT) Caps capsule        STOP taking these medications    potassium chloride 10 MEQ extended release capsule  Commonly known as: MICRO-K           Where to Get Your Medications      These medications were sent to [address] [address]    Phone: [phone]   · furosemide 40 MG tablet  · rivaroxaban 15 MG Tabs tablet          Note that more than 30 minutes was spent in preparing discharge papers, discussing discharge with patient, medication review, etc.       Signed:  Electronically signed by GINETTE Bello Mai, CNP on 3/4/2022 at 11:00 AM Mr Khan

## 2022-08-31 NOTE — ED ADULT TRIAGE NOTE - CHIEF COMPLAINT QUOTE
pt states " my pulse ox is low and my asthma is acting up " covid +, recent treated 8/27-8/30 for "covid pna"

## 2022-08-31 NOTE — ED PROVIDER NOTE - PROGRESS NOTE DETAILS
pt is high risk for decompensation.  no hypoxia in ED, but dyspnic.    feels better with supplemental o2

## 2022-09-01 VITALS
DIASTOLIC BLOOD PRESSURE: 66 MMHG | HEART RATE: 61 BPM | TEMPERATURE: 98 F | OXYGEN SATURATION: 96 % | RESPIRATION RATE: 17 BRPM | SYSTOLIC BLOOD PRESSURE: 138 MMHG

## 2022-09-01 LAB
ANION GAP SERPL CALC-SCNC: 12 MMOL/L — SIGNIFICANT CHANGE UP (ref 7–14)
BUN SERPL-MCNC: 23 MG/DL — HIGH (ref 10–20)
CALCIUM SERPL-MCNC: 9.4 MG/DL — SIGNIFICANT CHANGE UP (ref 8.5–10.1)
CHLORIDE SERPL-SCNC: 103 MMOL/L — SIGNIFICANT CHANGE UP (ref 98–110)
CO2 SERPL-SCNC: 27 MMOL/L — SIGNIFICANT CHANGE UP (ref 17–32)
CREAT SERPL-MCNC: 1.3 MG/DL — SIGNIFICANT CHANGE UP (ref 0.7–1.5)
EGFR: 53 ML/MIN/1.73M2 — LOW
FERRITIN SERPL-MCNC: 322 NG/ML — SIGNIFICANT CHANGE UP (ref 30–400)
GLUCOSE SERPL-MCNC: 167 MG/DL — HIGH (ref 70–99)
HCT VFR BLD CALC: 42.1 % — SIGNIFICANT CHANGE UP (ref 42–52)
HGB BLD-MCNC: 14.4 G/DL — SIGNIFICANT CHANGE UP (ref 14–18)
MCHC RBC-ENTMCNC: 29.9 PG — SIGNIFICANT CHANGE UP (ref 27–31)
MCHC RBC-ENTMCNC: 34.2 G/DL — SIGNIFICANT CHANGE UP (ref 32–37)
MCV RBC AUTO: 87.5 FL — SIGNIFICANT CHANGE UP (ref 80–94)
NRBC # BLD: 0 /100 WBCS — SIGNIFICANT CHANGE UP (ref 0–0)
PLATELET # BLD AUTO: 228 K/UL — SIGNIFICANT CHANGE UP (ref 130–400)
POTASSIUM SERPL-MCNC: 3.4 MMOL/L — LOW (ref 3.5–5)
POTASSIUM SERPL-SCNC: 3.4 MMOL/L — LOW (ref 3.5–5)
PROCALCITONIN SERPL-MCNC: 0.23 NG/ML — HIGH (ref 0.02–0.1)
RBC # BLD: 4.81 M/UL — SIGNIFICANT CHANGE UP (ref 4.7–6.1)
RBC # FLD: 13.2 % — SIGNIFICANT CHANGE UP (ref 11.5–14.5)
SODIUM SERPL-SCNC: 142 MMOL/L — SIGNIFICANT CHANGE UP (ref 135–146)
WBC # BLD: 5.68 K/UL — SIGNIFICANT CHANGE UP (ref 4.8–10.8)
WBC # FLD AUTO: 5.68 K/UL — SIGNIFICANT CHANGE UP (ref 4.8–10.8)

## 2022-09-01 PROCEDURE — 99221 1ST HOSP IP/OBS SF/LOW 40: CPT

## 2022-09-01 RX ORDER — IPRATROPIUM/ALBUTEROL SULFATE 18-103MCG
3 AEROSOL WITH ADAPTER (GRAM) INHALATION EVERY 4 HOURS
Refills: 0 | Status: DISCONTINUED | OUTPATIENT
Start: 2022-09-01 | End: 2022-09-01

## 2022-09-01 RX ORDER — ACETAMINOPHEN 500 MG
2 TABLET ORAL
Qty: 0 | Refills: 0 | DISCHARGE
Start: 2022-09-01

## 2022-09-01 RX ORDER — ALBUTEROL 90 UG/1
0 AEROSOL, METERED ORAL
Qty: 0 | Refills: 0 | DISCHARGE

## 2022-09-01 RX ADMIN — Medication 10 MILLILITER(S): at 11:04

## 2022-09-01 RX ADMIN — Medication 10 MILLILITER(S): at 13:40

## 2022-09-01 RX ADMIN — Medication 10 MILLILITER(S): at 05:14

## 2022-09-01 RX ADMIN — AMLODIPINE BESYLATE 5 MILLIGRAM(S): 2.5 TABLET ORAL at 05:15

## 2022-09-01 RX ADMIN — Medication 100 MILLIGRAM(S): at 05:15

## 2022-09-01 RX ADMIN — ENOXAPARIN SODIUM 40 MILLIGRAM(S): 100 INJECTION SUBCUTANEOUS at 05:15

## 2022-09-01 RX ADMIN — Medication 50 MICROGRAM(S): at 05:15

## 2022-09-01 RX ADMIN — Medication 25 MILLIGRAM(S): at 05:15

## 2022-09-01 RX ADMIN — VALSARTAN 320 MILLIGRAM(S): 80 TABLET ORAL at 05:14

## 2022-09-01 NOTE — CONSULT NOTE ADULT - SUBJECTIVE AND OBJECTIVE BOX
HECTOR VELAZQUEZ  87y, Male  Allergy: No Known Allergies      CHIEF COMPLAINT: SOB (31 Aug 2022 15:21)      HPI:  86y/o M w/ PMHx of Asthma, HTN, Hypothyroidism who p/w persistent SOB. Was recently dx'd with COVID-19 in [place] 8/27, sxs started 8/26. Tx'd for asthma exacerbation, given steroids x 1 and furosemide. D/c'd on paxlovid but did not take because was concerned for his kidneys. Pt felt sob again the next day after discharge and went back to the ED. Per family his pO2 this morning on pulse ox was 91% and had POWERS + leg swelling. Denies cp.  Denies fever or chills. + mild fatigue.  No n/v/d.  Wife also tested positive for COVID and is currently admitted.    In the ED :  BP : 140/66  HR : 95  O2 : 95 on RA   T : 96.8    WBC : 6  DD: 269  Na : 137  CRP : 30   BNP: 1k  trop <0.01 x1   CXR : Left basilar opacity/effusion  s/p ceftriaxone x1 , azithro x1 , lasix 20 Iv x1    Infectious Diseases History:  Old Micro Data/Cultures:     FAMILY HISTORY:    PAST MEDICAL & SURGICAL HISTORY:  Asthma    SOCIAL HISTORY  Social History:  no smoking no drinking , no alcohol use (31 Aug 2022 15:21)      Recent Travel: None    ROS  General: Denies rigors, nightsweats  HEENT: Denies headache, rhinorrhea, sore throat, eye pain  CV: Denies CP, palpitations  PULM: Denies wheezing, hemoptysis  GI: Denies hematemesis, hematochezia, melena  : Denies discharge, hematuria  MSK: Denies arthralgias, myalgias  SKIN: Denies rash, lesions  NEURO: Denies paresthesias, weakness  PSYCH: Denies depression, anxiety    VITALS:  T(F): 96.4, Max: 97.6 (08-31-22 @ 12:13)  HR: 67  BP: 165/78  RR: 18Vital Signs Last 24 Hrs  T(C): 35.8 (01 Sep 2022 05:12), Max: 36.4 (31 Aug 2022 12:13)  T(F): 96.4 (01 Sep 2022 05:12), Max: 97.6 (31 Aug 2022 12:13)  HR: 67 (01 Sep 2022 05:12) (63 - 95)  BP: 165/78 (01 Sep 2022 05:12) (139/61 - 167/76)  BP(mean): --  RR: 18 (01 Sep 2022 05:12) (17 - 18)  SpO2: 96% (01 Sep 2022 05:12) (95% - 96%)    Parameters below as of 31 Aug 2022 21:40  Patient On (Oxygen Delivery Method): room air        PHYSICAL EXAM:  Gen: NAD, resting in bed comfortably  HEENT: NCAT  CV: RRR  Lungs: CTAB. Normal respiratory effort  Abdomen: Soft, non-distended, non-tender  Ext: No LE edema  Lines: no phlebitis    TESTS & MEASUREMENTS:                        13.6   6.02  )-----------( 190      ( 31 Aug 2022 12:13 )             38.5     08-31    147<H>  |  110  |  25<H>  ----------------------------<  95  4.2   |  22  |  1.0    Ca    9.1      31 Aug 2022 12:13    TPro  6.5  /  Alb  3.8  /  TBili  0.5  /  DBili  x   /  AST  30  /  ALT  13  /  AlkPhos  72  08-31      LIVER FUNCTIONS - ( 31 Aug 2022 12:13 )  Alb: 3.8 g/dL / Pro: 6.5 g/dL / ALK PHOS: 72 U/L / ALT: 13 U/L / AST: 30 U/L / GGT: x             RADIOLOGY & ADDITIONAL TESTS:  I have personally reviewed the last available Chest xray  CXR      CT      CARDIOLOGY TESTING  12 Lead ECG:   Ventricular Rate 52 BPM    Atrial Rate 52 BPM    P-R Interval 164 ms    QRS Duration 140 ms    Q-T Interval 474 ms    QTC Calculation(Bazett) 440 ms    P Axis 46 degrees    R Axis 86 degrees    T Axis -42 degrees    Diagnosis Line Sinus bradycardiawith frequent Premature ventricular complexes  Right bundle branch block  T wave abnormality, consider inferior ischemia  Abnormal ECG    Confirmed by REJI WEBER MD ([id number]) on 8/31/2022 4:10:53 PM (08-31-22 @ 12:21)      All available historical records have been reviewed    MEDICATIONS  amLODIPine   Tablet 5  enoxaparin Injectable 40  guaifenesin/dextromethorphan Oral Liquid 10  hydrochlorothiazide 25  levothyroxine 50  metoprolol succinate   valsartan 320      ANTIBIOTICS:      All available historical data has been reviewed   HECTOR VELAZQUEZ  87y, Male  Allergy: No Known Allergies      CHIEF COMPLAINT: SOB (31 Aug 2022 15:21)      HPI:  86y/o M w/ PMHx of Asthma, HTN, Hypothyroidism who p/w persistent SOB. Was recently dx'd with COVID-19 in [place] 8/27, sxs started 8/26. Tx'd for asthma exacerbation, given steroids x 1 and furosemide. D/c'd on paxlovid but did not take because was concerned for his kidneys. Pt felt sob again the next day after discharge and went back to the ED. Per family his pO2 this morning on pulse ox was 91% and had POWERS + leg swelling. Denies cp.  Denies fever or chills. + mild fatigue.  No n/v/d.  Wife also tested positive for COVID and is currently admitted.    In the ED :  BP : 140/66  HR : 95  O2 : 95 on RA   T : 96.8    WBC : 6  DD: 269  Na : 137  CRP : 30   BNP: 1k  trop <0.01 x1   CXR : Left basilar opacity/effusion  s/p ceftriaxone x1 , azithro x1 , lasix 20 Iv x1    Infectious Diseases History:  Old Micro Data/Cultures:   None    FAMILY HISTORY:    PAST MEDICAL & SURGICAL HISTORY:  Asthma    SOCIAL HISTORY  Social History:  no smoking no drinking , no alcohol use (31 Aug 2022 15:21)      Recent Travel: [place]    ROS  General: Denies rigors, nightsweats  HEENT: Denies headache, rhinorrhea, sore throat, eye pain  CV: Denies CP, palpitations  PULM: Denies wheezing, hemoptysis  GI: Denies hematemesis, hematochezia, melena  : Denies discharge, hematuria  MSK: Denies arthralgias, myalgias  SKIN: Denies rash, lesions  NEURO: Denies paresthesias, weakness  PSYCH: Denies depression, anxiety    VITALS:  T(F): 96.4, Max: 97.6 (08-31-22 @ 12:13)  HR: 67  BP: 165/78  RR: 18Vital Signs Last 24 Hrs  T(C): 35.8 (01 Sep 2022 05:12), Max: 36.4 (31 Aug 2022 12:13)  T(F): 96.4 (01 Sep 2022 05:12), Max: 97.6 (31 Aug 2022 12:13)  HR: 67 (01 Sep 2022 05:12) (63 - 95)  BP: 165/78 (01 Sep 2022 05:12) (139/61 - 167/76)  BP(mean): --  RR: 18 (01 Sep 2022 05:12) (17 - 18)  SpO2: 96% (01 Sep 2022 05:12) (95% - 96%)    Parameters below as of 31 Aug 2022 21:40  Patient On (Oxygen Delivery Method): room air        PHYSICAL EXAM:  Gen: NAD, resting in bed comfortably  HEENT: NCAT  CV: RRR  Lungs: CTAB. Normal respiratory effort  Abdomen: Soft, non-distended, non-tender  Ext: No LE edema  Lines: no phlebitis    TESTS & MEASUREMENTS:                        13.6   6.02  )-----------( 190      ( 31 Aug 2022 12:13 )             38.5     08-31    147<H>  |  110  |  25<H>  ----------------------------<  95  4.2   |  22  |  1.0    Ca    9.1      31 Aug 2022 12:13    TPro  6.5  /  Alb  3.8  /  TBili  0.5  /  DBili  x   /  AST  30  /  ALT  13  /  AlkPhos  72  08-31      LIVER FUNCTIONS - ( 31 Aug 2022 12:13 )  Alb: 3.8 g/dL / Pro: 6.5 g/dL / ALK PHOS: 72 U/L / ALT: 13 U/L / AST: 30 U/L / GGT: x             RADIOLOGY & ADDITIONAL TESTS:  I have personally reviewed the last available Chest xray  CXR      CT      CARDIOLOGY TESTING  12 Lead ECG:   Ventricular Rate 52 BPM    Atrial Rate 52 BPM    P-R Interval 164 ms    QRS Duration 140 ms    Q-T Interval 474 ms    QTC Calculation(Bazett) 440 ms    P Axis 46 degrees    R Axis 86 degrees    T Axis -42 degrees    Diagnosis Line Sinus bradycardiawith frequent Premature ventricular complexes  Right bundle branch block  T wave abnormality, consider inferior ischemia  Abnormal ECG    Confirmed by REJI WEBER MD ([id number]) on 8/31/2022 4:10:53 PM (08-31-22 @ 12:21)      All available historical records have been reviewed    MEDICATIONS  amLODIPine   Tablet 5  enoxaparin Injectable 40  guaifenesin/dextromethorphan Oral Liquid 10  hydrochlorothiazide 25  levothyroxine 50  metoprolol succinate   valsartan 320      ANTIBIOTICS:      All available historical data has been reviewed

## 2022-09-01 NOTE — DISCHARGE NOTE NURSING/CASE MANAGEMENT/SOCIAL WORK - PATIENT PORTAL LINK FT
You can access the FollowMyHealth Patient Portal offered by [facility] by registering at the following website: [email or web address]. By joining [facility]’s FollowMyHealth portal, you will also be able to view your health information using other applications (apps) compatible with our system.

## 2022-09-01 NOTE — DISCHARGE NOTE PROVIDER - NSDCMRMEDTOKEN_GEN_ALL_CORE_FT
acetaminophen 325 mg oral tablet: 2 tab(s) orally every 6 hours, As needed, Temp greater or equal to 38C (100.4F), Mild Pain (1 - 3)  amLODIPine 5 mg oral tablet: 1 tab(s) orally once a day  dextromethorphan-guaifenesin 15 mg-400 mg oral tablet: 1 tab(s) orally 2 times a day   levothyroxine 50 mcg (0.05 mg) oral tablet: 1 tab(s) orally once a day  Nebivolol 5 mg oral tablet: 1 tab(s) orally once a day  ProAir HFA:   valsartan-hydrochlorothiazide 320 mg-25 mg oral tablet: 1 tab(s) orally once a day   acetaminophen 325 mg oral tablet: 2 tab(s) orally every 6 hours, As needed, Temp greater or equal to 38C (100.4F), Mild Pain (1 - 3)  amLODIPine 5 mg oral tablet: 1 tab(s) orally once a day  levothyroxine 50 mcg (0.05 mg) oral tablet: 1 tab(s) orally once a day  Nebivolol 5 mg oral tablet: 1 tab(s) orally once a day  valsartan-hydrochlorothiazide 320 mg-25 mg oral tablet: 1 tab(s) orally once a day

## 2022-09-01 NOTE — CONSULT NOTE ADULT - ATTENDING COMMENTS
I have personally seen and examined this patient.  I have fully participated in the care of this patient.  I have reviewed all pertinent clinical information, including history, physical exam, plan and note.  I have reviewed all pertinent clinical information and reviewed all relevant imaging and diagnostic studies personally.  Corrections and edits were made wherever needed.       #COVID nonsevere   - no therapeutics at this time, D7 and improving     If any questions, please call or send a message on [employer]  Please continue to update ID with any pertinent new laboratory or radiographic findings  Spectra [id number]

## 2022-09-01 NOTE — DISCHARGE NOTE PROVIDER - HOSPITAL COURSE
HPI:   86 yo m  PMH of htn, asthma, hypothyroidism presented with c/o persisting sob. Pt was recently dx with covid in [place] 8/27, symptoms started 8/26. Symptoms started while on a cruise with other sick passengers. Wife was also sick Airlifted to hospital in [place].Treated for asthma exacerbation, given steroids x 1 and furosemide discharged on paxlovid but did not take because was concerned for his kidneys. Pt felt sob again the next day after discharge and went back to the ED. Per family his pO2 this morning on pulse ox was 91% and had POWERS + leg swelling. Pt denies cp.  Denies fever or chills.  mild fatigue.  no n/v/d.    He was admitted for management of covid + possible asthma exacerbation     ED Course:   - Workup shoed normal WBC, procal mildly positive 0.23, CXR showed small left sided opacity  - spO2 95% on RA   - He received one dose ceftriaxone, one dose azithromycin and one dose IV lasix 20 mg     Hospital course :   - Patient was given prednisone 40 mg, duonebs   - afebrile, no O2 requirements   - ID consulted: no recommendation for RDV or paxlovid because pt is at his baseline O2 and day 6 of covid.     Discharge:   Patient was seen and examined at bedside today morning.   He reports feeling better and the shortness of breath resolved.  He is stable and ready for discharge.

## 2022-09-01 NOTE — DISCHARGE NOTE NURSING/CASE MANAGEMENT/SOCIAL WORK - NSDCPEFALRISK_GEN_ALL_CORE
For information on Fall & Injury Prevention, visit: [email or web address] OR  [email or web address] OR  https://www.cdc.gov/steadi/patient.html

## 2022-09-01 NOTE — DISCHARGE NOTE PROVIDER - NSDCCPCAREPLAN_GEN_ALL_CORE_FT
PRINCIPAL DISCHARGE DIAGNOSIS  Diagnosis: Pneumonia due to COVID-19 virus  Assessment and Plan of Treatment: You were admitted initially for shortness of breath. You have tested covid +ve since 8/27. You tested covid + again here. You had mild disease as you did not require any oxygen, your chest xray did not show signs of severe infection or pneumonia and your inflammatory markers were not very elevated. Infectious disease consulted and recommended not to give remdesivir ( an antiviral medication used in the treatment of covid ) or paxlovid because you have mild illness and you're at day 6 of your disease.       PRINCIPAL DISCHARGE DIAGNOSIS  Diagnosis: Pneumonia due to COVID-19 virus  Assessment and Plan of Treatment: You were admitted initially for shortness of breath. You have tested covid +ve since 8/27. You tested covid + again here. You had mild disease as you did not require any oxygen, your chest xray did not show signs of severe infection or pneumonia and your inflammatory markers were not very elevated. Infectious disease consulted and recommended not to give remdesivir ( an antiviral medication used in the treatment of covid ) or paxlovid because you have mild illness and you're at day 6 of your disease. You can be discharged home safely.   Please seak medical attention if you feel severe shortness of breath, chest pain, or fever.

## 2022-09-01 NOTE — CONSULT NOTE ADULT - ASSESSMENT
ASSESSMENT  88y/o M w/ PMHx of Asthma, HTN, Hypothyroidism who p/w SOB. Tested positive for COVID-19 on 8/27. Admitted to medicine for management of dyspnea. ID was consulted for COVID-19 +    IMPRESSION  # Dyspnea  # COVID-19  - Tested positive on 8/27/22  - Vaccinated x 2, Booster x 1  - CXR (8/31/22): ? LL basilar opacity/effusion (appears unchanged from prior CXR in 7/2022)  - s/p Furosemide, steroids at OSH  - s/p ceftriaxone x 1, azithromycin x 1, lasix 20mg x 1in ED  - Not requiring supplemental O2  - No sepsis on admission  Plan:  - Please start on RDV (200 mg on day 1, 100 mg on day 2 or 3) x 3 days     This is a pended note. Please await final recommendations following ID attending attestation.   ASSESSMENT  88y/o M w/ PMHx of Asthma, HTN, Hypothyroidism who p/w SOB. Tested positive for COVID-19 on 8/27. Admitted to medicine for management of dyspnea. ID was consulted for COVID-19 +    IMPRESSION  # Dyspnea  # COVID-19  - Tested positive on 8/27/22  - Vaccinated x 2, Booster x 1  - No sepsis on admission (no leukocytosis, afebrile)  - CXR (8/31/22): ? LL basilar opacity/effusion (appears unchanged from prior CXR in 7/2022)  - s/p Furosemide, steroids at OSH  - s/p ceftriaxone x 1, azithromycin x 1, lasix 20mg x 1in ED  - Not requiring supplemental O2    Plan:  - Please start on RDV (200 mg on day 1, 100 mg on day 2 or 3) x 3 days     This is a pended note. Please await final recommendations following ID attending attestation.   ASSESSMENT  88y/o M w/ PMHx of Asthma, HTN, Hypothyroidism who p/w SOB. Tested positive for COVID-19 on 8/27. Admitted to medicine for management of dyspnea. ID was consulted for COVID-19 +    ASSESSMENT & PLAN:  # Dyspnea, resolved  # Mild COVID-19  - Tested positive on 8/27/22  - Vaccinated x 2, Booster x 1  - No sepsis on admission (no leukocytosis, afebrile)  - CXR (8/31/22): ? LL basilar opacity/effusion (appears unchanged from prior CXR in 7/2022)  - s/p Furosemide, steroids at OSH  - s/p ceftriaxone x 1, azithromycin x 1, lasix 20mg x 1in ED  - Not requiring supplemental O2  Plan:  - Can start on RDV (200 mg on day 1, 100 mg on day 2 or 3) x 3 days if inpatient (does not need to stay for this)  - If being discharged, can send rx for 1/2 dose of PO paxlovid due to ALVARO (please advise pt to take 1/2 dose of home amlodipine 5mg while taking Paxlovid)  - Steroids per primary team    This is a pended note. Please await final recommendations following ID attending attestation.   ASSESSMENT  88y/o M w/ PMHx of Asthma, HTN, Hypothyroidism who p/w SOB. Tested positive for COVID-19 on 8/27. Admitted to medicine for management of dyspnea. ID was consulted for COVID-19 +    ASSESSMENT & PLAN:  # Dyspnea, resolved  # Mild COVID-19  - Tested positive on 8/27/22  - Vaccinated x 2, Booster x 1  - No sepsis on admission (no leukocytosis, afebrile)  - CXR (8/31/22): ? LL basilar opacity/effusion (appears unchanged from prior CXR in 7/2022)  - s/p Furosemide, steroids at OSH  - s/p ceftriaxone x 1, azithromycin x 1, lasix 20mg x 1in ED  - Not requiring supplemental O2  Plan:  - Can start on RDV (200 mg on day 1, 100 mg on day 2 or 3) x 3 days if inpatient (does not need to stay for this)  - If being discharged, can send rx for PO paxlovid (150 mg nirmatrelvir tablet + 100 mg ritonavir tablet) BID x 5 days (dosage is 1/2 as eGFR is 53). Please advise pt to take 1/2 dose of home amlodipine 5mg while taking Paxlovid.  - Steroids per primary team    This is a pended note. Please await final recommendations following ID attending attestation.   ASSESSMENT  86y/o M w/ PMHx of Asthma, HTN, Hypothyroidism who p/w SOB. Tested positive for COVID-19 on 8/27. Admitted to medicine for management of dyspnea. ID was consulted for COVID-19 +    ASSESSMENT & PLAN:  # Dyspnea, resolved  # Mild COVID-19  - Tested positive on 8/27/22  - Vaccinated x 2, Booster x 1  - C/o productive cough, SOB-improved  - No sepsis on admission (no leukocytosis, afebrile)  - CXR (8/31/22): ? LL basilar opacity/effusion (appears unchanged from prior CXR in 7/2022)  - s/p Furosemide, steroids at OSH  - s/p ceftriaxone x 1, azithromycin x 1, lasix 20mg x 1in ED  - Not requiring supplemental O2  Plan:  - No recommendations for RDV or paxlovid at this time as patient is at baseline O2 reqs and is on day 6 of COVID-19    This is a pended note. Please await final recommendations following ID attending attestation.   ASSESSMENT  88y/o M w/ PMHx of Asthma, HTN, Hypothyroidism who p/w SOB. Tested positive for COVID-19 on 8/27. Admitted to medicine for management of dyspnea. ID was consulted for COVID-19 +    ASSESSMENT & PLAN:  # Dyspnea, resolved  # Mild COVID-19  - Tested positive on 8/27/22  - Vaccinated x 2, Booster x 1  - C/o productive cough, SOB-improved  - No sepsis on admission (no leukocytosis, afebrile)  - CXR (8/31/22): ? LL basilar opacity/effusion (appears unchanged from prior CXR in 7/2022)  - s/p Furosemide, steroids at OSH  - s/p ceftriaxone x 1, azithromycin x 1, lasix 20mg x 1in ED  - Not requiring supplemental O2  Plan:  - No recommendations for RDV or paxlovid at this time as patient is at baseline O2 reqs and is on day 7 of COVID-19 as improved

## 2022-09-01 NOTE — PHARMACOTHERAPY INTERVENTION NOTE - COMMENTS
If patient discharged on Paxlovid, recommended doing reduced dose of nirmatrelvir 150mg/ritonavir 100mg PO twice daily given eGFR of 53.

## 2022-09-05 LAB
CULTURE RESULTS: SIGNIFICANT CHANGE UP
CULTURE RESULTS: SIGNIFICANT CHANGE UP
SPECIMEN SOURCE: SIGNIFICANT CHANGE UP
SPECIMEN SOURCE: SIGNIFICANT CHANGE UP

## 2022-09-15 DIAGNOSIS — I10 ESSENTIAL (PRIMARY) HYPERTENSION: ICD-10-CM

## 2022-09-15 DIAGNOSIS — U07.1 COVID-19: ICD-10-CM

## 2022-09-15 DIAGNOSIS — J12.82 PNEUMONIA DUE TO CORONAVIRUS DISEASE 2019: ICD-10-CM

## 2022-09-15 DIAGNOSIS — J45.901 UNSPECIFIED ASTHMA WITH (ACUTE) EXACERBATION: ICD-10-CM

## 2022-09-15 DIAGNOSIS — E03.9 HYPOTHYROIDISM, UNSPECIFIED: ICD-10-CM

## 2022-10-02 ENCOUNTER — EMERGENCY (EMERGENCY)
Facility: HOSPITAL | Age: 87
LOS: 0 days | Discharge: HOME | End: 2022-10-03
Attending: EMERGENCY MEDICINE | Admitting: EMERGENCY MEDICINE

## 2022-10-02 VITALS
DIASTOLIC BLOOD PRESSURE: 87 MMHG | SYSTOLIC BLOOD PRESSURE: 212 MMHG | OXYGEN SATURATION: 96 % | RESPIRATION RATE: 17 BRPM | WEIGHT: 176.37 LBS | TEMPERATURE: 97 F | HEART RATE: 66 BPM

## 2022-10-02 DIAGNOSIS — J45.909 UNSPECIFIED ASTHMA, UNCOMPLICATED: ICD-10-CM

## 2022-10-02 DIAGNOSIS — I10 ESSENTIAL (PRIMARY) HYPERTENSION: ICD-10-CM

## 2022-10-02 DIAGNOSIS — J98.11 ATELECTASIS: ICD-10-CM

## 2022-10-02 DIAGNOSIS — R07.89 OTHER CHEST PAIN: ICD-10-CM

## 2022-10-02 DIAGNOSIS — Z20.822 CONTACT WITH AND (SUSPECTED) EXPOSURE TO COVID-19: ICD-10-CM

## 2022-10-02 DIAGNOSIS — E03.9 HYPOTHYROIDISM, UNSPECIFIED: ICD-10-CM

## 2022-10-02 DIAGNOSIS — Z79.52 LONG TERM (CURRENT) USE OF SYSTEMIC STEROIDS: ICD-10-CM

## 2022-10-02 LAB
ALBUMIN SERPL ELPH-MCNC: 4.4 G/DL — SIGNIFICANT CHANGE UP (ref 3.5–5.2)
ALP SERPL-CCNC: 92 U/L — SIGNIFICANT CHANGE UP (ref 30–115)
ALT FLD-CCNC: 10 U/L — SIGNIFICANT CHANGE UP (ref 0–41)
ANION GAP SERPL CALC-SCNC: 12 MMOL/L — SIGNIFICANT CHANGE UP (ref 7–14)
APPEARANCE UR: CLEAR — SIGNIFICANT CHANGE UP
APTT BLD: 32.5 SEC — SIGNIFICANT CHANGE UP (ref 27–39.2)
AST SERPL-CCNC: 18 U/L — SIGNIFICANT CHANGE UP (ref 0–41)
BASOPHILS # BLD AUTO: 0.05 K/UL — SIGNIFICANT CHANGE UP (ref 0–0.2)
BASOPHILS NFR BLD AUTO: 0.6 % — SIGNIFICANT CHANGE UP (ref 0–1)
BILIRUB SERPL-MCNC: 0.9 MG/DL — SIGNIFICANT CHANGE UP (ref 0.2–1.2)
BILIRUB UR-MCNC: NEGATIVE — SIGNIFICANT CHANGE UP
BUN SERPL-MCNC: 16 MG/DL — SIGNIFICANT CHANGE UP (ref 10–20)
CALCIUM SERPL-MCNC: 9.8 MG/DL — SIGNIFICANT CHANGE UP (ref 8.4–10.5)
CHLORIDE SERPL-SCNC: 105 MMOL/L — SIGNIFICANT CHANGE UP (ref 98–110)
CO2 SERPL-SCNC: 26 MMOL/L — SIGNIFICANT CHANGE UP (ref 17–32)
COLOR SPEC: COLORLESS — SIGNIFICANT CHANGE UP
CREAT SERPL-MCNC: 1.3 MG/DL — SIGNIFICANT CHANGE UP (ref 0.7–1.5)
D DIMER BLD IA.RAPID-MCNC: 182 NG/ML DDU — SIGNIFICANT CHANGE UP (ref 0–230)
DIFF PNL FLD: NEGATIVE — SIGNIFICANT CHANGE UP
EGFR: 53 ML/MIN/1.73M2 — LOW
EOSINOPHIL # BLD AUTO: 0.08 K/UL — SIGNIFICANT CHANGE UP (ref 0–0.7)
EOSINOPHIL NFR BLD AUTO: 0.9 % — SIGNIFICANT CHANGE UP (ref 0–8)
GLUCOSE SERPL-MCNC: 100 MG/DL — HIGH (ref 70–99)
GLUCOSE UR QL: SIGNIFICANT CHANGE UP
HCT VFR BLD CALC: 41.4 % — LOW (ref 42–52)
HGB BLD-MCNC: 14.6 G/DL — SIGNIFICANT CHANGE UP (ref 14–18)
IMM GRANULOCYTES NFR BLD AUTO: 0.5 % — HIGH (ref 0.1–0.3)
INR BLD: 0.98 RATIO — SIGNIFICANT CHANGE UP (ref 0.65–1.3)
KETONES UR-MCNC: NEGATIVE — SIGNIFICANT CHANGE UP
LEUKOCYTE ESTERASE UR-ACNC: NEGATIVE — SIGNIFICANT CHANGE UP
LYMPHOCYTES # BLD AUTO: 2.27 K/UL — SIGNIFICANT CHANGE UP (ref 1.2–3.4)
LYMPHOCYTES # BLD AUTO: 26 % — SIGNIFICANT CHANGE UP (ref 20.5–51.1)
MAGNESIUM SERPL-MCNC: 1.7 MG/DL — LOW (ref 1.8–2.4)
MCHC RBC-ENTMCNC: 31.9 PG — HIGH (ref 27–31)
MCHC RBC-ENTMCNC: 35.3 G/DL — SIGNIFICANT CHANGE UP (ref 32–37)
MCV RBC AUTO: 90.4 FL — SIGNIFICANT CHANGE UP (ref 80–94)
MONOCYTES # BLD AUTO: 0.7 K/UL — HIGH (ref 0.1–0.6)
MONOCYTES NFR BLD AUTO: 8 % — SIGNIFICANT CHANGE UP (ref 1.7–9.3)
NEUTROPHILS # BLD AUTO: 5.58 K/UL — SIGNIFICANT CHANGE UP (ref 1.4–6.5)
NEUTROPHILS NFR BLD AUTO: 64 % — SIGNIFICANT CHANGE UP (ref 42.2–75.2)
NITRITE UR-MCNC: NEGATIVE — SIGNIFICANT CHANGE UP
NRBC # BLD: 0 /100 WBCS — SIGNIFICANT CHANGE UP (ref 0–0)
NT-PROBNP SERPL-SCNC: 535 PG/ML — HIGH (ref 0–300)
PH UR: 6 — SIGNIFICANT CHANGE UP (ref 5–8)
PLATELET # BLD AUTO: 185 K/UL — SIGNIFICANT CHANGE UP (ref 130–400)
POTASSIUM SERPL-MCNC: 3.9 MMOL/L — SIGNIFICANT CHANGE UP (ref 3.5–5)
POTASSIUM SERPL-SCNC: 3.9 MMOL/L — SIGNIFICANT CHANGE UP (ref 3.5–5)
PROT SERPL-MCNC: 7.2 G/DL — SIGNIFICANT CHANGE UP (ref 6–8)
PROT UR-MCNC: SIGNIFICANT CHANGE UP
PROTHROM AB SERPL-ACNC: 11.2 SEC — SIGNIFICANT CHANGE UP (ref 9.95–12.87)
RBC # BLD: 4.58 M/UL — LOW (ref 4.7–6.1)
RBC # FLD: 13.9 % — SIGNIFICANT CHANGE UP (ref 11.5–14.5)
SARS-COV-2 RNA SPEC QL NAA+PROBE: SIGNIFICANT CHANGE UP
SODIUM SERPL-SCNC: 143 MMOL/L — SIGNIFICANT CHANGE UP (ref 135–146)
SP GR SPEC: 1 — LOW (ref 1.01–1.03)
TROPONIN T SERPL-MCNC: <0.01 NG/ML — SIGNIFICANT CHANGE UP
TROPONIN T SERPL-MCNC: <0.01 NG/ML — SIGNIFICANT CHANGE UP
UROBILINOGEN FLD QL: SIGNIFICANT CHANGE UP
WBC # BLD: 8.72 K/UL — SIGNIFICANT CHANGE UP (ref 4.8–10.8)
WBC # FLD AUTO: 8.72 K/UL — SIGNIFICANT CHANGE UP (ref 4.8–10.8)

## 2022-10-02 PROCEDURE — 74177 CT ABD & PELVIS W/CONTRAST: CPT | Mod: 26,MA

## 2022-10-02 PROCEDURE — 93010 ELECTROCARDIOGRAM REPORT: CPT

## 2022-10-02 PROCEDURE — 93010 ELECTROCARDIOGRAM REPORT: CPT | Mod: 77

## 2022-10-02 PROCEDURE — 71045 X-RAY EXAM CHEST 1 VIEW: CPT | Mod: 26

## 2022-10-02 PROCEDURE — 99218: CPT | Mod: FS,CS

## 2022-10-02 PROCEDURE — 71275 CT ANGIOGRAPHY CHEST: CPT | Mod: 26,MA

## 2022-10-02 RX ORDER — REGADENOSON 0.08 MG/ML
0.4 INJECTION, SOLUTION INTRAVENOUS ONCE
Refills: 0 | Status: COMPLETED | OUTPATIENT
Start: 2022-10-02 | End: 2022-10-03

## 2022-10-02 RX ORDER — MAGNESIUM OXIDE 400 MG ORAL TABLET 241.3 MG
400 TABLET ORAL ONCE
Refills: 0 | Status: COMPLETED | OUTPATIENT
Start: 2022-10-02 | End: 2022-10-02

## 2022-10-02 RX ORDER — ASPIRIN/CALCIUM CARB/MAGNESIUM 324 MG
324 TABLET ORAL ONCE
Refills: 0 | Status: COMPLETED | OUTPATIENT
Start: 2022-10-02 | End: 2022-10-02

## 2022-10-02 RX ADMIN — MAGNESIUM OXIDE 400 MG ORAL TABLET 400 MILLIGRAM(S): 241.3 TABLET ORAL at 12:53

## 2022-10-02 RX ADMIN — Medication 324 MILLIGRAM(S): at 11:20

## 2022-10-02 NOTE — ED PROVIDER NOTE - NS ED ATTENDING STATEMENT MOD
This was a shared visit with the CIARRA. I reviewed and verified the documentation and independently performed the documented:

## 2022-10-02 NOTE — ED CDU PROVIDER INITIAL DAY NOTE - PHYSICAL EXAMINATION
CONST: Well appearing in NAD  EYES: Sclera and conjunctiva clear.   ENT: No nasal discharge. Oropharynx normal appearing, no erythema or exudates. No abscess or swelling. Uvula midline.   NECK: Non-tender, no meningeal signs. normal ROM. supple   CARD: S1 S2; No jvd  RESP: Equal BS B/L, No wheezes, rhonchi or rales. No distress  GI: Soft, non-tender, non-distended. no cva tenderness. normal BS  MS: Normal ROM in all extremities. pulses 2 +. no calf tenderness or swelling  SKIN: Warm, dry, no acute rashes. Good turgor  NEURO: A&Ox3, No focal deficits. Strength 5/5 with no sensory deficits.

## 2022-10-02 NOTE — ED PROVIDER NOTE - CLINICAL SUMMARY MEDICAL DECISION MAKING FREE TEXT BOX
87yM HTN p/w chest pain since yesterday.  VS notable for marked hypertension, likely acute on chronic.  EKG w/o ischemia or arrhythmia.  CXR w/o ptx or pna though with ?inc pulm vasc congestion.  Labs reassuring, including neg trop x 1 though mild to moderate hypomagnesemia (repleted) and borderline elevated BNP.  Pt treated with aspirin and placed in obs for further cardiac testing given high risk hx/risk factors and no prior cardiac testing.

## 2022-10-02 NOTE — ED CDU PROVIDER INITIAL DAY NOTE - MEDICAL DECISION MAKING DETAILS
87-year-old man past medical history significant for hypertension, asthma, hypothyroidism complaining of left flank pain x2 days.  Flank pain is dull and nonradiating.  Flank pain worsens with deep inspiration.  No shortness of breath, nausea, palpitations, dizziness or diaphoresis.  Normal urination.  No hematuria or dysuria or frequency.  No nausea, vomiting.  Normal BMs.  No cough, fever, chills or URI symptoms.  Patient had a recent plane flight to [place] for a cruise.  No leg pain or edema.  Vitals noted.  CONSTITUTIONAL: Well-appearing; well-nourished; in no apparent distress.   HEAD: Normocephalic; atraumatic.   EYES: PERRL; EOM intact. Conjunctiva normal B/L.   ENT: Normal pharynx with no tonsillar hypertrophy. MMM.  NECK: Supple; non-tender; no cervical lymphadenopathy.   CHEST: Normal chest excursion with respiration.   CARDIOVASCULAR: Normal S1, S2; no murmurs, rubs, or gallops.   RESPIRATORY: Normal chest excursion with respiration; breath sounds clear and equal bilaterally; no wheezes, rhonchi, or rales.  GI/: Normal bowel sounds; non-distended; non-tender.  BACK: No evidence of trauma or deformity. Non-tender to palpation. No CVA tenderness.   EXT: Normal ROM in all four extremities; non-tender to palpation; distal pulses are normal. No leg edema B/L.   SKIN: Normal for age and race; warm; dry; good turgor.  NEURO: A & O x 4; CN 2-12 intact. Grossly unremarkable.

## 2022-10-02 NOTE — ED PROVIDER NOTE - PHYSICAL EXAMINATION
CONST: Well appearing in NAD  EYES: Sclera and conjunctiva clear.   ENT: No nasal discharge. Oropharynx normal appearing  NECK: Non-tender, no meningeal signs. normal ROM. supple   CARD: S1 S2; No jvd  RESP: Equal BS B/L, No wheezes, rhonchi or rales. No distress  GI: Soft, non-tender, non-distended. no cva tenderness. normal BS  MS: Normal ROM in all extremities. pulses 2 +. no calf tenderness or swelling  SKIN: Warm, dry, no acute rashes. Good turgor  NEURO: A&Ox3, No focal deficits. Strength 5/5 with no sensory deficits.

## 2022-10-02 NOTE — ED PROVIDER NOTE - OBJECTIVE STATEMENT
87y M pmh HTN, Asthma, Hypothyroid presents for eval of chest pain. Pt developed mild pressure like L sided chest pain @2200 last night, no inciting or relieving factors. Denies fever, ha, cough, sob, weakness, numbness, dysuria, hematuria, n/v/d/c

## 2022-10-02 NOTE — ED CDU PROVIDER INITIAL DAY NOTE - ATTENDING APP SHARED VISIT CONTRIBUTION OF CARE
I personally evaluated the patient. I reviewed the Resident’s or Physician Assistant’s note (as assigned above), and agree with the findings and plan except as documented in my note.   87-year-old man past medical history significant for hypertension, asthma, hypothyroidism complaining of left flank pain x2 days.  Flank pain is dull and nonradiating.  Flank pain worsens with deep inspiration.  No shortness of breath, nausea, palpitations, dizziness or diaphoresis.  Normal urination.  No hematuria or dysuria or frequency.  No nausea, vomiting.  Normal BMs.  No cough, fever, chills or URI symptoms.  Patient had a recent plane flight to [place] for a cruise.  No leg pain or edema.  Vitals noted.  CONSTITUTIONAL: Well-appearing; well-nourished; in no apparent distress.   HEAD: Normocephalic; atraumatic.   EYES: PERRL; EOM intact. Conjunctiva normal B/L.   ENT: Normal pharynx with no tonsillar hypertrophy. MMM.  NECK: Supple; non-tender; no cervical lymphadenopathy.   CHEST: Normal chest excursion with respiration.   CARDIOVASCULAR: Normal S1, S2; no murmurs, rubs, or gallops.   RESPIRATORY: Normal chest excursion with respiration; breath sounds clear and equal bilaterally; no wheezes, rhonchi, or rales.  GI/: Normal bowel sounds; non-distended; non-tender.  BACK: No evidence of trauma or deformity. Non-tender to palpation. No CVA tenderness.   EXT: Normal ROM in all four extremities; non-tender to palpation; distal pulses are normal. No leg edema B/L.   SKIN: Normal for age and race; warm; dry; good turgor.  NEURO: A & O x 4; CN 2-12 intact. Grossly unremarkable.

## 2022-10-02 NOTE — ED ADULT NURSE NOTE - OBJECTIVE STATEMENT
Pt a&ox3, in ED for intermittent, non-radiating, CP x last night, describes pain as squeezing, no N/V/D, denies headache, dizziness and lightheadedness, denies abdomina pain, PMH of HTN & asthma.

## 2022-10-02 NOTE — ED CDU PROVIDER INITIAL DAY NOTE - PROGRESS NOTE DETAILS
Patient c/o chest pain but points to left flank.  States he few back from [place] after taking a cruise about 2 weeks ago. D-dimer and urine ordered. Will continue to monitor. Patient advised of right lower lobe 0.9 cm nodular opacity seen on CT and need for f/u CT in 3 months. Copy of result given to patient.

## 2022-10-03 VITALS
SYSTOLIC BLOOD PRESSURE: 150 MMHG | DIASTOLIC BLOOD PRESSURE: 68 MMHG | RESPIRATION RATE: 18 BRPM | OXYGEN SATURATION: 97 % | HEART RATE: 50 BPM | TEMPERATURE: 98 F

## 2022-10-03 PROCEDURE — 78452 HT MUSCLE IMAGE SPECT MULT: CPT | Mod: 26,MA

## 2022-10-03 PROCEDURE — 93016 CV STRESS TEST SUPVJ ONLY: CPT

## 2022-10-03 PROCEDURE — 93018 CV STRESS TEST I&R ONLY: CPT

## 2022-10-03 PROCEDURE — 99217: CPT | Mod: CS

## 2022-10-03 RX ADMIN — REGADENOSON 0.4 MILLIGRAM(S): 0.08 INJECTION, SOLUTION INTRAVENOUS at 10:59

## 2022-10-03 NOTE — ED CDU PROVIDER DISPOSITION NOTE - CLINICAL COURSE
pt presented to ed for eval of chest pain. pt placed in edou under low risk chest pain protocol. pt with workup including labs wnl, including 2 sets of negative cardiac enzymes, 2 ekg with no ischemic changes, normal cxray, normal nuclear stress test, no events on cardiac monitor. no chest pain at time of dc. pt advised to f/u with cardiology. Return for any concerning signs or symptoms. additionally ct imaging chest/abdomen no acute findings.

## 2022-10-03 NOTE — ED CDU PROVIDER DISPOSITION NOTE - CARE PROVIDER_API CALL
Eulalia Wilson)  Cardiovascular Disease; Internal Medicine  [address] [place] [address]  Phone: [phone]  Fax: [phone]  Follow Up Time:

## 2022-10-03 NOTE — ED CDU PROVIDER SUBSEQUENT DAY NOTE - PROGRESS NOTE DETAILS
Pt feeling well, CP completely resolved, normal lexiscan stress. Copies of all testing provided to patient.

## 2022-10-03 NOTE — ED ADULT NURSE REASSESSMENT NOTE - NS ED NURSE REASSESS COMMENT FT1
Pt A&O4, remained on cardiac monitor, no s/s of distress, denies of pain or discomfort. Safety and comfort measures met, POC updated, continue to monitor
Patient is A&Ox4, fully functional and denies pain at this time. Left AC 20g has +flush with NO BR. Patient is under Observation for NM Stress Test today.

## 2022-10-03 NOTE — ED CDU PROVIDER DISPOSITION NOTE - PATIENT PORTAL LINK FT
You can access the FollowMyHealth Patient Portal offered by [facility] by registering at the following website: [email or web address]. By joining [facility]’s FollowMyHealth portal, you will also be able to view your health information using other applications (apps) compatible with our system.

## 2023-01-10 NOTE — ED ADULT NURSE NOTE - NS ED NURSE LEVEL OF CONSCIOUSNESS ORIENTATION
What Type Of Note Output Would You Prefer (Optional)?: Bullet Format
How Severe Is Your Rash?: mild
Is This A New Presentation, Or A Follow-Up?: Rash
Oriented - self; Oriented - place; Oriented - time

## 2023-01-19 NOTE — ED ADULT NURSE NOTE - NS ED NURSE RECORD ANOTHER HT AND WT
[facility]    PROGRESS NOTE    Patient:  Rich Sargent    :  1965    Date of Service: 23    The encounter diagnosis was Mild episode of recurrent major depressive disorder (CMS/HCC).    Data: Rich reported doing \"inner child\" exercises to get a better understanding why he was feeling frustrated lately, and he was able to identify recovery from his medical issues as the contributing factor.  Rich reported engaging in painting again and enjoying the activity.  Rich reported wanting to work on dealing appropriately with stress related to his medical issues.     Intervention:  Cognitive Behavioral Strategies    Patient continues to be involved in service planning:  YES    Describe above interventions: Psychologist reinforced Rich for engaging in \"inner child\" work to help him reflect and determine what was contributing to his stress.  Psychologist worked with Rich on cognitive strategies to increase self-awareness about his medical issues to consider what factors are within/outside his control and focus his efforts on those aspects where he can affect change.  Psychologist worked with Rich on behavioral strategies for maintaining consistent self-care.  Psychologist provided Rich an empathic environment to address his concerns.    Patient's response to interventions: Rich actively participated in therapy and reflected on the information discussed.  Rich agreed to work on coping skills discussed in session.    Continue to support patient's efforts and progress towards established treatment plan goals in the following ways: Rich will continue therapy to address factors contributing to exacerbation of symptoms of depression.    Off-site:  No   45 minutes were spent in this encounter.    This visit is being performed virtually via Video visit using Duriana Patti.   Clinical Location: [facility]  Rich's location Home and is  physically present in   the [facility] at the time of this visit.      Yes

## 2023-07-13 NOTE — ED ADULT NURSE NOTE - PAIN: PRESENCE, MLM
What Type Of Note Output Would You Prefer (Optional)?: Bullet Format What Is The Reason For Today's Visit?: Full Body Skin Examination with No Concerns denies pain/discomfort

## 2024-08-13 ENCOUNTER — APPOINTMENT (OUTPATIENT)
Dept: PULMONOLOGY | Facility: CLINIC | Age: 89
End: 2024-08-13
Payer: MEDICARE

## 2024-08-13 VITALS
WEIGHT: 160 LBS | SYSTOLIC BLOOD PRESSURE: 112 MMHG | OXYGEN SATURATION: 97 % | HEIGHT: 65 IN | DIASTOLIC BLOOD PRESSURE: 64 MMHG | BODY MASS INDEX: 26.66 KG/M2 | HEART RATE: 75 BPM

## 2024-08-13 DIAGNOSIS — R09.02 HYPOXEMIA: ICD-10-CM

## 2024-08-13 DIAGNOSIS — J15.9 UNSPECIFIED BACTERIAL PNEUMONIA: ICD-10-CM

## 2024-08-13 PROBLEM — Z00.00 ENCOUNTER FOR PREVENTIVE HEALTH EXAMINATION: Status: ACTIVE | Noted: 2024-08-13

## 2024-08-13 PROCEDURE — 99203 OFFICE O/P NEW LOW 30 MIN: CPT

## 2024-08-13 RX ORDER — HYDROCHLOROTHIAZIDE 12.5 MG/1
12.5 CAPSULE ORAL DAILY
Refills: 0 | Status: ACTIVE | COMMUNITY

## 2024-08-13 RX ORDER — PREDNISONE 10 MG/1
10 TABLET ORAL
Refills: 0 | Status: ACTIVE | COMMUNITY

## 2024-08-13 RX ORDER — AMLODIPINE BESYLATE AND BENAZEPRIL HYDROCHLORIDE 5; 10 MG/1; MG/1
5-10 CAPSULE ORAL DAILY
Refills: 0 | Status: ACTIVE | COMMUNITY

## 2024-08-13 RX ORDER — ALLOPURINOL 300 MG/1
300 TABLET ORAL DAILY
Refills: 0 | Status: ACTIVE | COMMUNITY

## 2024-08-13 RX ORDER — AMOXICILLIN AND CLAVULANATE POTASSIUM 875; 125 MG/1; MG/1
875-125 TABLET, COATED ORAL
Refills: 0 | Status: ACTIVE | COMMUNITY

## 2024-08-13 RX ORDER — LEVOTHYROXINE SODIUM 0.05 MG/1
50 TABLET ORAL DAILY
Refills: 0 | Status: ACTIVE | COMMUNITY

## 2024-08-13 RX ORDER — SPIRONOLACTONE 25 MG/1
25 TABLET ORAL EVERY OTHER DAY
Refills: 0 | Status: ACTIVE | COMMUNITY

## 2024-08-13 RX ORDER — ALBUTEROL SULFATE 90 UG/1
108 AEROSOL, METERED RESPIRATORY (INHALATION)
Refills: 0 | Status: ACTIVE | COMMUNITY

## 2024-08-13 RX ORDER — GUAIFENESIN, CODEINE PHOSPHATE AND PSEUDOEPHEDRINE HYDROCHLORIDE 100; 10; 30 MG/5ML; MG/5ML; MG/5ML
30-10-100 LIQUID ORAL EVERY 4 HOURS
Refills: 0 | Status: ACTIVE | COMMUNITY

## 2024-08-13 NOTE — HISTORY OF PRESENT ILLNESS
[TextBox_4] : - Summary : The patient is an elderly male who was recently hospitalized for bacterial pneumonia affecting both lungs. He was treated with antibiotics and steroids (prednisone) during his hospital stay. He is now recovering at home but experiencing weakness, difficulty walking, and occasional mucus production. He is feeling much better though. - Chief Complaint (CC) : Follow-up visit after hospitalization for pneumonia. - History of Present Illness (HPI) : - Vincent was hospitalized for about a week due to bilateral pneumonia.  - He was treated with antibiotics and prednisone during his hospital stay.  - He has completed the course of prednisone.  - He is now experiencing weakness and difficulty walking, requiring a wheelchair.  - He occasionally produces a small amount (2-3 cc) of white mucus in the morning and evening.  - Past Medical History : asthma mild intermittent  - Lives on [place]  - No history of smoking mentioned  - Medications : - Recently completed a course of prednisone  - Uses albuterol inhaler as needed for mild intermittent asthma  - Allergies : Not mentioned.

## 2024-08-13 NOTE — ASSESSMENT
[FreeTextEntry1] : Assessment: - Summary : an elderly patient recovering from a severe episode of bacterial pneumonia affecting both lungs. He has completed treatment with antibiotics and steroids but is experiencing significant weakness and deconditioning as a result of the illness and prolonged hospitalization. - Problems : - Pneumonia (recovering)  - Deconditioning  - Weakness  - Differential Diagnosis : - The primary diagnosis is pneumonia, with the current symptoms being related to the recovery process and deconditioning from the illness and hospitalization.  Plan: - Summary : The plan is to facilitate Vincent's recovery through a pulmonary rehabilitation program, gradual weaning from supplemental oxygen, and close monitoring of his progress. A follow-up chest X-ray will be obtained to ensure complete resolution of the pneumonia. - Plan : - Refer Vincent to the pulmonary rehabilitation program at the local hospital for a monitored exercise program to aid in his recovery and reconditioning.  - Gradually wean Vincent off supplemental oxygen by monitoring his oxygen saturation levels during activity and at rest. Discontinue oxygen if levels remain above 90% consistently.  - Obtain a follow-up chest X-ray in 1 months to confirm complete resolution of the pneumonia.  - Schedule a follow-up appointment in 3-4 months to assess progress and determine if further interventions are needed.  - Encourage pt to gradually resume normal activities and exercise as tolerated to regain strength and endurance.  - Consider using over-the-counter mucolytics (e.g., Mucinex) if mucus production persists and becomes problematic

## 2024-09-30 ENCOUNTER — NON-APPOINTMENT (OUTPATIENT)
Age: 89
End: 2024-09-30

## 2024-10-18 ENCOUNTER — APPOINTMENT (OUTPATIENT)
Dept: PULMONOLOGY | Facility: CLINIC | Age: 89
End: 2024-10-18

## 2024-11-05 ENCOUNTER — APPOINTMENT (OUTPATIENT)
Dept: PULMONOLOGY | Facility: CLINIC | Age: 89
End: 2024-11-05
Payer: MEDICARE

## 2024-11-05 VITALS
OXYGEN SATURATION: 96 % | HEART RATE: 73 BPM | DIASTOLIC BLOOD PRESSURE: 64 MMHG | SYSTOLIC BLOOD PRESSURE: 122 MMHG | HEIGHT: 65 IN | WEIGHT: 179 LBS | BODY MASS INDEX: 29.82 KG/M2

## 2024-11-05 DIAGNOSIS — J15.9 UNSPECIFIED BACTERIAL PNEUMONIA: ICD-10-CM

## 2024-11-05 PROCEDURE — 99213 OFFICE O/P EST LOW 20 MIN: CPT
